# Patient Record
Sex: FEMALE
[De-identification: names, ages, dates, MRNs, and addresses within clinical notes are randomized per-mention and may not be internally consistent; named-entity substitution may affect disease eponyms.]

---

## 2017-08-24 PROBLEM — Z00.00 ENCOUNTER FOR PREVENTIVE HEALTH EXAMINATION: Status: ACTIVE | Noted: 2017-08-24

## 2017-11-20 ENCOUNTER — APPOINTMENT (OUTPATIENT)
Dept: NEUROLOGY | Facility: CLINIC | Age: 82
End: 2017-11-20
Payer: MEDICARE

## 2017-11-20 VITALS
SYSTOLIC BLOOD PRESSURE: 163 MMHG | DIASTOLIC BLOOD PRESSURE: 72 MMHG | BODY MASS INDEX: 19.24 KG/M2 | WEIGHT: 98 LBS | HEIGHT: 60 IN | HEART RATE: 60 BPM

## 2017-11-20 DIAGNOSIS — F02.81 ALZHEIMER'S DISEASE WITH EARLY ONSET: ICD-10-CM

## 2017-11-20 DIAGNOSIS — E05.20 THYROTOXICOSIS WITH TOXIC MULTINODULAR GOITER W/OUT THYROTOXIC CRISIS OR STORM: ICD-10-CM

## 2017-11-20 DIAGNOSIS — G30.0 ALZHEIMER'S DISEASE WITH EARLY ONSET: ICD-10-CM

## 2017-11-20 DIAGNOSIS — E78.5 HYPERLIPIDEMIA, UNSPECIFIED: ICD-10-CM

## 2017-11-20 DIAGNOSIS — I10 ESSENTIAL (PRIMARY) HYPERTENSION: ICD-10-CM

## 2017-11-20 DIAGNOSIS — Z78.9 OTHER SPECIFIED HEALTH STATUS: ICD-10-CM

## 2017-11-20 DIAGNOSIS — Z82.49 FAMILY HISTORY OF ISCHEMIC HEART DISEASE AND OTHER DISEASES OF THE CIRCULATORY SYSTEM: ICD-10-CM

## 2017-11-20 DIAGNOSIS — Z80.9 FAMILY HISTORY OF MALIGNANT NEOPLASM, UNSPECIFIED: ICD-10-CM

## 2017-11-20 PROCEDURE — 99205 OFFICE O/P NEW HI 60 MIN: CPT

## 2018-01-02 RX ORDER — VENLAFAXINE 75 MG/1
75 TABLET ORAL DAILY
Refills: 0 | Status: DISCONTINUED | COMMUNITY
Start: 2017-11-20 | End: 2018-01-02

## 2018-01-16 ENCOUNTER — RX RENEWAL (OUTPATIENT)
Age: 83
End: 2018-01-16

## 2018-01-17 ENCOUNTER — APPOINTMENT (OUTPATIENT)
Dept: NEUROLOGY | Facility: CLINIC | Age: 83
End: 2018-01-17

## 2018-02-13 ENCOUNTER — APPOINTMENT (OUTPATIENT)
Dept: NEUROLOGY | Facility: CLINIC | Age: 83
End: 2018-02-13
Payer: MEDICARE

## 2018-02-13 VITALS — SYSTOLIC BLOOD PRESSURE: 178 MMHG | HEART RATE: 71 BPM | DIASTOLIC BLOOD PRESSURE: 69 MMHG | HEIGHT: 60 IN

## 2018-02-13 PROCEDURE — 99214 OFFICE O/P EST MOD 30 MIN: CPT

## 2018-02-13 RX ORDER — MEMANTINE HYDROCHLORIDE AND DONEPEZIL HYDROCHLORIDE 28; 10 MG/1; MG/1
28-10 CAPSULE ORAL
Qty: 30 | Refills: 3 | Status: DISCONTINUED | COMMUNITY
Start: 2017-11-20 | End: 2018-02-13

## 2018-02-19 ENCOUNTER — RX RENEWAL (OUTPATIENT)
Age: 83
End: 2018-02-19

## 2018-02-21 ENCOUNTER — APPOINTMENT (OUTPATIENT)
Dept: NEUROLOGY | Facility: CLINIC | Age: 83
End: 2018-02-21
Payer: MEDICARE

## 2018-02-21 PROCEDURE — 93880 EXTRACRANIAL BILAT STUDY: CPT

## 2018-02-21 PROCEDURE — 93888 INTRACRANIAL LIMITED STUDY: CPT

## 2018-03-19 ENCOUNTER — OTHER (OUTPATIENT)
Age: 83
End: 2018-03-19

## 2018-03-19 ENCOUNTER — RX RENEWAL (OUTPATIENT)
Age: 83
End: 2018-03-19

## 2018-03-19 RX ORDER — SERTRALINE HYDROCHLORIDE 50 MG/1
50 TABLET, FILM COATED ORAL DAILY
Qty: 30 | Refills: 2 | Status: DISCONTINUED | COMMUNITY
Start: 2018-01-02 | End: 2018-03-19

## 2018-03-19 RX ORDER — MEMANTINE HYDROCHLORIDE 14 MG/1
14 CAPSULE, EXTENDED RELEASE ORAL
Qty: 30 | Refills: 0 | Status: DISCONTINUED | COMMUNITY
Start: 2018-02-13 | End: 2018-03-19

## 2018-04-23 ENCOUNTER — RX RENEWAL (OUTPATIENT)
Age: 83
End: 2018-04-23

## 2018-08-27 ENCOUNTER — RX RENEWAL (OUTPATIENT)
Age: 83
End: 2018-08-27

## 2018-09-06 ENCOUNTER — RX RENEWAL (OUTPATIENT)
Age: 83
End: 2018-09-06

## 2018-09-09 ENCOUNTER — RX RENEWAL (OUTPATIENT)
Age: 83
End: 2018-09-09

## 2018-09-13 RX ORDER — MEMANTINE HYDROCHLORIDE 7 MG/1
7 CAPSULE, EXTENDED RELEASE ORAL
Qty: 30 | Refills: 2 | Status: DISCONTINUED | COMMUNITY
Start: 2018-08-13 | End: 2018-09-13

## 2018-10-08 ENCOUNTER — APPOINTMENT (OUTPATIENT)
Dept: NEUROLOGY | Facility: CLINIC | Age: 83
End: 2018-10-08
Payer: MEDICARE

## 2018-10-08 VITALS
BODY MASS INDEX: 19.24 KG/M2 | WEIGHT: 98 LBS | HEIGHT: 60 IN | HEART RATE: 63 BPM | DIASTOLIC BLOOD PRESSURE: 73 MMHG | SYSTOLIC BLOOD PRESSURE: 175 MMHG

## 2018-10-08 DIAGNOSIS — F43.21 ADJUSTMENT DISORDER WITH DEPRESSED MOOD: ICD-10-CM

## 2018-10-08 PROCEDURE — 99214 OFFICE O/P EST MOD 30 MIN: CPT

## 2018-10-16 ENCOUNTER — RX RENEWAL (OUTPATIENT)
Age: 83
End: 2018-10-16

## 2018-11-09 ENCOUNTER — RX RENEWAL (OUTPATIENT)
Age: 83
End: 2018-11-09

## 2018-11-27 ENCOUNTER — RX RENEWAL (OUTPATIENT)
Age: 83
End: 2018-11-27

## 2018-11-28 ENCOUNTER — RX RENEWAL (OUTPATIENT)
Age: 83
End: 2018-11-28

## 2018-12-02 ENCOUNTER — RX RENEWAL (OUTPATIENT)
Age: 83
End: 2018-12-02

## 2018-12-28 ENCOUNTER — RX RENEWAL (OUTPATIENT)
Age: 83
End: 2018-12-28

## 2018-12-31 ENCOUNTER — RX RENEWAL (OUTPATIENT)
Age: 83
End: 2018-12-31

## 2019-01-14 RX ORDER — DONEPEZIL HYDROCHLORIDE 23 MG/1
23 TABLET, FILM COATED ORAL DAILY
Qty: 30 | Refills: 2 | Status: DISCONTINUED | COMMUNITY
Start: 2018-02-13 | End: 2019-01-14

## 2019-02-21 ENCOUNTER — RX RENEWAL (OUTPATIENT)
Age: 84
End: 2019-02-21

## 2019-03-28 RX ORDER — ALPRAZOLAM 0.25 MG/1
0.25 TABLET ORAL DAILY
Refills: 0 | Status: DISCONTINUED | COMMUNITY
Start: 2018-02-13 | End: 2019-03-28

## 2019-03-28 RX ORDER — DONEPEZIL HYDROCHLORIDE 10 MG/1
10 TABLET ORAL DAILY
Qty: 90 | Refills: 0 | Status: DISCONTINUED | COMMUNITY
Start: 2019-01-14 | End: 2019-03-28

## 2019-04-29 RX ORDER — MEMANTINE HYDROCHLORIDE AND DONEPEZIL HYDROCHLORIDE 7-10/14-10
7 & 14 & 21 &28 KIT ORAL
Qty: 1 | Refills: 1 | Status: DISCONTINUED | COMMUNITY
Start: 2019-03-28 | End: 2019-04-29

## 2019-05-06 ENCOUNTER — APPOINTMENT (OUTPATIENT)
Dept: NEUROLOGY | Facility: CLINIC | Age: 84
End: 2019-05-06
Payer: MEDICARE

## 2019-05-06 VITALS
SYSTOLIC BLOOD PRESSURE: 160 MMHG | BODY MASS INDEX: 23.56 KG/M2 | HEIGHT: 60 IN | HEART RATE: 80 BPM | DIASTOLIC BLOOD PRESSURE: 80 MMHG | WEIGHT: 120 LBS

## 2019-05-06 PROCEDURE — 99214 OFFICE O/P EST MOD 30 MIN: CPT

## 2019-05-06 RX ORDER — MEMANTINE HYDROCHLORIDE AND DONEPEZIL HYDROCHLORIDE 28; 10 MG/1; MG/1
28-10 CAPSULE ORAL
Qty: 30 | Refills: 3 | Status: DISCONTINUED | COMMUNITY
Start: 2019-04-29 | End: 2019-05-06

## 2019-05-06 NOTE — PHYSICAL EXAM
[General Appearance - Alert] : alert [General Appearance - In No Acute Distress] : in no acute distress [Impaired Insight] : insight and judgment were intact [Oriented To Time, Place, And Person] : oriented to person, place, and time [Affect] : the affect was normal [Person] : oriented to person [Time] : oriented to time [Place] : oriented to place [Remote Intact] : remote memory intact [Registration Intact] : recent registration memory intact [Concentration Intact] : normal concentrating ability [Visual Intact] : visual attention was ~T not ~L decreased [Naming Objects] : no difficulty naming common objects [Repeating Phrases] : no difficulty repeating a phrase [Writing A Sentence] : no difficulty writing a sentence [Fluency] : fluency intact [Comprehension] : comprehension intact [Reading] : reading intact [Past History] : adequate knowledge of personal past history [Vocabulary] : adequate range of vocabulary [Total Score ___ / 30] : the patient achieved a score of [unfilled] /30 [Date / Time ___ / 5] : date / time [unfilled] / 5 [Place ___ / 5] : place [unfilled] / 5 [Registration ___ / 3] : registration [unfilled] / 3 [Serial Sevens ___/5] : serial sevens [unfilled] / 5 [Naming 2 Objects ___ / 2] : naming two objects [unfilled] / 2 [Repeating a Sentence ___ / 1] : repeating a sentence [unfilled] / 1 [3-stage Verbal Command ___ / 3] : three-stage verbal command [unfilled] / 3 [Writing a Sentence ___ / 1] : write sentence [unfilled] / 1 [Written Command ___ / 1] : written command [unfilled] / 1 [Copy a Design ___ / 1] : copy a design [unfilled] / 1 [Cranial Nerves Optic (II)] : visual acuity intact bilaterally,  visual fields full to confrontation, pupils equal round and reactive to light [Recall ___ / 3] : recall [unfilled] / 3 [Cranial Nerves Facial (VII)] : face symmetrical [Cranial Nerves Oculomotor (III)] : extraocular motion intact [Cranial Nerves Trigeminal (V)] : facial sensation intact symmetrically [Cranial Nerves Glossopharyngeal (IX)] : tongue and palate midline [Cranial Nerves Vestibulocochlear (VIII)] : hearing was intact bilaterally [Cranial Nerves Hypoglossal (XII)] : there was no tongue deviation with protrusion [Cranial Nerves Accessory (XI - Cranial And Spinal)] : head turning and shoulder shrug symmetric [Motor Strength] : muscle strength was normal in all four extremities [Involuntary Movements] : no involuntary movements were seen [No Muscle Atrophy] : normal bulk in all four extremities [Motor Handedness Right-Handed] : the patient is right hand dominant [Sensation Tactile Decrease] : light touch was intact [Sensation Pain / Temperature Decrease] : pain and temperature was intact [Sensation Vibration Decrease] : vibration was intact [Proprioception] : proprioception was intact [Balance] : balance was intact [2+] : Brachioradialis right 2+ [1+] : Ankle jerk left 1+ [PERRL With Normal Accommodation] : pupils were equal in size, round, reactive to light, with normal accommodation [Sclera] : the sclera and conjunctiva were normal [Extraocular Movements] : extraocular movements were intact [Optic Disc Abnormality] : the optic disc were normal in size and color [No APD] : no afferent pupillary defect [No JOSE] : no internuclear ophthalmoplegia [Full Visual Field] : full visual field [Outer Ear] : the ears and nose were normal in appearance [Oropharynx] : the oropharynx was normal [Neck Appearance] : the appearance of the neck was normal [Neck Cervical Mass (___cm)] : no neck mass was observed [Jugular Venous Distention Increased] : there was no jugular-venous distention [Thyroid Diffuse Enlargement] : the thyroid was not enlarged [Thyroid Nodule] : there were no palpable thyroid nodules [Auscultation Breath Sounds / Voice Sounds] : lungs were clear to auscultation bilaterally [Heart Rate And Rhythm] : heart rate was normal and rhythm regular [Heart Sounds] : normal S1 and S2 [Murmurs] : no murmurs [Heart Sounds Gallop] : no gallops [Heart Sounds Pericardial Friction Rub] : no pericardial rub [Arterial Pulses Carotid] : carotid pulses were normal with no bruits [Full Pulse] : the pedal pulses are present [Edema] : there was no peripheral edema [Bowel Sounds] : normal bowel sounds [Abdomen Soft] : soft [Abdomen Tenderness] : non-tender [Abdomen Mass (___ Cm)] : no abdominal mass palpated [No CVA Tenderness] : no ~M costovertebral angle tenderness [No Spinal Tenderness] : no spinal tenderness [Abnormal Walk] : normal gait [Nail Clubbing] : no clubbing  or cyanosis of the fingernails [Musculoskeletal - Swelling] : no joint swelling seen [Motor Tone] : muscle strength and tone were normal [Skin Color & Pigmentation] : normal skin color and pigmentation [Skin Turgor] : normal skin turgor [] : no rash [FreeTextEntry1] : Appears more confused and frustrated.  [Short Term Intact] : short term memory impaired [Span Intact] : the attention span was decreased [Current Events] : inadequate knowledge of current events [Allodynia] : no ~T allodynia present [Dysesthesia] : no dysesthesia [Romberg's Sign] : Romberg's sign was negtive [Hyperesthesia] : no hyperesthesia [Limited Balance] : balance was intact [Past-pointing] : there was no past-pointing [Tremor] : no tremor present [Dysdiadochokinesia Bilaterally] : not present [Coordination - Dysmetria Impaired Finger-to-Nose Bilateral] : not present [Coordination - Dysmetria Impaired Heel-to-Shin Bilateral] : not present [Plantar Reflex Right Only] : normal on the right [Plantar Reflex Left Only] : normal on the left [FreeTextEntry4] : Presidents: 0/5.\par Alternating Pattern: ok\par Clock: 2/3, can read clocks\par Luria: ok, forgets pattern\par Spiral: ok\par Knows R/L on self and examiner.\par X-line commands: ok\par Praxia: ok.

## 2019-05-06 NOTE — ASSESSMENT
[FreeTextEntry1] : Assessment:\par 89yo RH WW, with progressive cognitive decline. She has not tolerated Namzaric, likely the Memantine component. \par Also Lexapro dc due to weight gain, and now appears more frustrated, confused and depressed. \par Physically fine, no focal neuro signs, no parkinsonism.\par \par Impression:\par LOAD\par Depression\par \par Plan:\par -will dc Memantine, continue with ARicept alone, 10mg\par -will consider starting Paxil in the next 3 weeks, after progress report by daughter\par -encourage exercise as well as senior activities.\par \par A thorough discussion was entertained with the patient/caregiver regarding the use of psychoactive medications, their possible benefits and AE profile, including the risk of cardiovascular complications.\par We discussed the benefits of being active, physically and mentally, and the need to to establish a routine in this respect.\par Driving abilities and firearms possession and use were discussed, in relation to progression of the cognitive decline, and the need to assess them periodically.\par Patient/caregiver understand and agree with the plan.\par

## 2019-05-06 NOTE — HISTORY OF PRESENT ILLNESS
[FreeTextEntry1] : HPI-Interval Hx [id number]:\par since starting Namnzaric, per daughter, she is more unsteady, more unsure, more confused. Her PCP dc Lexapro due to weight gain. She also appears more depressed. \par Going to senior center 4 days a week, but not much exercise. \par Appetite is fine. Sleep is maintained. \par per family, gait is a bit slower, but no falls. \par \par HPI-Interval Hx [id number]:\par After the recent passing of her , she has had a set back, of mood and memory. She had lost weight, now getting it back.\par Daughter is very distraught, as she also lost her  1y ago, and s trying to get better.\par Better with Lexapro and Zyprexa.\par \par HPI-Interval Hx [id number]:\par Family unable to get MRI, will call directly. \par Ok on meds, no AE, but has sundowning confusion and gets anxious.\par Physically ok, sleep and appetite are fine. \par \par PMH:\par 87yo RH WW, with HTN, HLD, depression, here for evaluation of memory issues. 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 Started Namzaric, now at max dose. \par \par ADL: ok.\par IADL: limited. can cook and wash house. Does Chores. \par Sleep: ok.\par Appetite: intact, but has lost several lbs in the last few months. \par \par Personality: she might react badly to criticism. \par \par She has been very independent and active until very recently.

## 2019-05-06 NOTE — DATA REVIEWED
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

## 2019-05-06 NOTE — REASON FOR VISIT
[Follow-Up: _____] : a [unfilled] follow-up visit [Spouse] : spouse [Family Member] : family member [FreeTextEntry1] : LOAD

## 2019-06-13 ENCOUNTER — RX RENEWAL (OUTPATIENT)
Age: 84
End: 2019-06-13

## 2019-06-17 ENCOUNTER — RX RENEWAL (OUTPATIENT)
Age: 84
End: 2019-06-17

## 2019-07-15 ENCOUNTER — MEDICATION RENEWAL (OUTPATIENT)
Age: 84
End: 2019-07-15

## 2019-08-05 ENCOUNTER — APPOINTMENT (OUTPATIENT)
Dept: NEUROLOGY | Facility: CLINIC | Age: 84
End: 2019-08-05
Payer: MEDICARE

## 2019-08-05 VITALS
DIASTOLIC BLOOD PRESSURE: 65 MMHG | BODY MASS INDEX: 24.19 KG/M2 | HEART RATE: 81 BPM | SYSTOLIC BLOOD PRESSURE: 168 MMHG | HEIGHT: 59 IN | WEIGHT: 120 LBS

## 2019-08-05 PROCEDURE — 99214 OFFICE O/P EST MOD 30 MIN: CPT

## 2019-08-05 RX ORDER — ESCITALOPRAM OXALATE 10 MG/1
10 TABLET ORAL DAILY
Qty: 90 | Refills: 0 | Status: DISCONTINUED | COMMUNITY
Start: 2017-11-20 | End: 2019-08-05

## 2019-08-05 NOTE — HISTORY OF PRESENT ILLNESS
[FreeTextEntry1] : HPI-Interval Hx [id number]:\par Pt overall stable, but apathy is important.\par Per daughter, she goes with her to do activities and exercise, but when she is at home she would sleep all the time.\par Lexapro at 5mg, reduced due to sleepiness, though not the cause.\par Appetite is fine. \par \par HPI-Interval Hx [id number]:\par since starting Namnzaric, per daughter, she is more unsteady, more unsure, more confused. Her PCP dc Lexapro due to weight gain. She also appears more depressed. \par Going to [employer] 4 days a week, but not much exercise. \par Appetite is fine. Sleep is maintained. \par per family, gait is a bit slower, but no falls. \par \par HPI-Interval Hx [id number]:\par After the recent passing of her , she has had a set back, of mood and memory. She had lost weight, now getting it back.\par Daughter is very distraught, as she also lost her  1y ago, and s trying to get better.\par Better with Lexapro and Zyprexa.\par \par HPI-Interval Hx [id number]:\par Family unable to get MRI, will call directly. \par Ok on meds, no AE, but has sundowning confusion and gets anxious.\par Physically ok, sleep and appetite are fine. \par \par PMH:\par 89yo RH WW, with HTN, HLD, depression, here for evaluation of memory issues. 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 Started Namzaric, now at max dose. \par \par ADL: ok.\par IADL: limited. can cook and wash house. Does Chores. \par Sleep: ok.\par Appetite: intact, but has lost several lbs in the last few months. \par \par Personality: she might react badly to criticism. \par \par She has been very independent and active until very recently.

## 2019-08-05 NOTE — DATA REVIEWED
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

## 2019-08-05 NOTE — PHYSICAL EXAM
[General Appearance - Alert] : alert [General Appearance - In No Acute Distress] : in no acute distress [Oriented To Time, Place, And Person] : oriented to person, place, and time [Impaired Insight] : insight and judgment were intact [Affect] : the affect was normal [Person] : oriented to person [Place] : oriented to place [Time] : oriented to time [Remote Intact] : remote memory intact [Registration Intact] : recent registration memory intact [Concentration Intact] : normal concentrating ability [Naming Objects] : no difficulty naming common objects [Visual Intact] : visual attention was ~T not ~L decreased [Repeating Phrases] : no difficulty repeating a phrase [Writing A Sentence] : no difficulty writing a sentence [Fluency] : fluency intact [Comprehension] : comprehension intact [Reading] : reading intact [Vocabulary] : adequate range of vocabulary [Past History] : adequate knowledge of personal past history [Total Score ___ / 30] : the patient achieved a score of [unfilled] /30 [Date / Time ___ / 5] : date / time [unfilled] / 5 [Place ___ / 5] : place [unfilled] / 5 [Serial Sevens ___/5] : serial sevens [unfilled] / 5 [Registration ___ / 3] : registration [unfilled] / 3 [Naming 2 Objects ___ / 2] : naming two objects [unfilled] / 2 [Repeating a Sentence ___ / 1] : repeating a sentence [unfilled] / 1 [Writing a Sentence ___ / 1] : write sentence [unfilled] / 1 [3-stage Verbal Command ___ / 3] : three-stage verbal command [unfilled] / 3 [Copy a Design ___ / 1] : copy a design [unfilled] / 1 [Written Command ___ / 1] : written command [unfilled] / 1 [Recall ___ / 3] : recall [unfilled] / 3 [Cranial Nerves Trigeminal (V)] : facial sensation intact symmetrically [Cranial Nerves Optic (II)] : visual acuity intact bilaterally,  visual fields full to confrontation, pupils equal round and reactive to light [Cranial Nerves Oculomotor (III)] : extraocular motion intact [Cranial Nerves Facial (VII)] : face symmetrical [Cranial Nerves Vestibulocochlear (VIII)] : hearing was intact bilaterally [Cranial Nerves Glossopharyngeal (IX)] : tongue and palate midline [Cranial Nerves Accessory (XI - Cranial And Spinal)] : head turning and shoulder shrug symmetric [Cranial Nerves Hypoglossal (XII)] : there was no tongue deviation with protrusion [Involuntary Movements] : no involuntary movements were seen [Motor Strength] : muscle strength was normal in all four extremities [No Muscle Atrophy] : normal bulk in all four extremities [Motor Handedness Right-Handed] : the patient is right hand dominant [Sensation Tactile Decrease] : light touch was intact [Sensation Pain / Temperature Decrease] : pain and temperature was intact [Sensation Vibration Decrease] : vibration was intact [Proprioception] : proprioception was intact [Balance] : balance was intact [2+] : Brachioradialis left 2+ [1+] : Ankle jerk left 1+ [Sclera] : the sclera and conjunctiva were normal [Extraocular Movements] : extraocular movements were intact [PERRL With Normal Accommodation] : pupils were equal in size, round, reactive to light, with normal accommodation [Optic Disc Abnormality] : the optic disc were normal in size and color [No APD] : no afferent pupillary defect [Full Visual Field] : full visual field [No JOSE] : no internuclear ophthalmoplegia [Oropharynx] : the oropharynx was normal [Outer Ear] : the ears and nose were normal in appearance [Neck Appearance] : the appearance of the neck was normal [Jugular Venous Distention Increased] : there was no jugular-venous distention [Neck Cervical Mass (___cm)] : no neck mass was observed [Thyroid Nodule] : there were no palpable thyroid nodules [Thyroid Diffuse Enlargement] : the thyroid was not enlarged [Auscultation Breath Sounds / Voice Sounds] : lungs were clear to auscultation bilaterally [Heart Sounds] : normal S1 and S2 [Heart Rate And Rhythm] : heart rate was normal and rhythm regular [Heart Sounds Gallop] : no gallops [Heart Sounds Pericardial Friction Rub] : no pericardial rub [Murmurs] : no murmurs [Arterial Pulses Carotid] : carotid pulses were normal with no bruits [Full Pulse] : the pedal pulses are present [Edema] : there was no peripheral edema [Bowel Sounds] : normal bowel sounds [Abdomen Soft] : soft [Abdomen Tenderness] : non-tender [Abdomen Mass (___ Cm)] : no abdominal mass palpated [No CVA Tenderness] : no ~M costovertebral angle tenderness [No Spinal Tenderness] : no spinal tenderness [Abnormal Walk] : normal gait [Nail Clubbing] : no clubbing  or cyanosis of the fingernails [Musculoskeletal - Swelling] : no joint swelling seen [Motor Tone] : muscle strength and tone were normal [Skin Color & Pigmentation] : normal skin color and pigmentation [Skin Turgor] : normal skin turgor [] : no rash [FreeTextEntry1] : Exam stable.  [Short Term Intact] : short term memory impaired [Span Intact] : the attention span was decreased [Current Events] : inadequate knowledge of current events [Romberg's Sign] : Romberg's sign was negtive [Allodynia] : no ~T allodynia present [Dysesthesia] : no dysesthesia [Hyperesthesia] : no hyperesthesia [Limited Balance] : balance was intact [Past-pointing] : there was no past-pointing [Tremor] : no tremor present [Coordination - Dysmetria Impaired Finger-to-Nose Bilateral] : not present [Dysdiadochokinesia Bilaterally] : not present [Plantar Reflex Right Only] : normal on the right [Coordination - Dysmetria Impaired Heel-to-Shin Bilateral] : not present [Plantar Reflex Left Only] : normal on the left [FreeTextEntry4] : Presidents: 0/5.\par Alternating Pattern: ok\par Clock: 2/3, can read clocks\par Luria: ok, forgets pattern\par Spiral: ok\par Knows R/L on self and examiner.\par X-line commands: ok\par Praxia: ok.

## 2019-08-19 ENCOUNTER — RX RENEWAL (OUTPATIENT)
Age: 84
End: 2019-08-19

## 2019-09-16 ENCOUNTER — RX RENEWAL (OUTPATIENT)
Age: 84
End: 2019-09-16

## 2020-01-07 ENCOUNTER — APPOINTMENT (OUTPATIENT)
Dept: NEUROLOGY | Facility: CLINIC | Age: 85
End: 2020-01-07
Payer: MEDICARE

## 2020-01-07 VITALS — HEART RATE: 80 BPM | DIASTOLIC BLOOD PRESSURE: 77 MMHG | SYSTOLIC BLOOD PRESSURE: 169 MMHG

## 2020-01-07 PROCEDURE — 99214 OFFICE O/P EST MOD 30 MIN: CPT

## 2020-01-07 RX ORDER — OLANZAPINE 2.5 MG/1
2.5 TABLET, FILM COATED ORAL
Qty: 90 | Refills: 3 | Status: DISCONTINUED | COMMUNITY
Start: 2018-09-13 | End: 2020-01-07

## 2020-01-07 NOTE — PHYSICAL EXAM
[General Appearance - In No Acute Distress] : in no acute distress [General Appearance - Alert] : alert [Impaired Insight] : insight and judgment were intact [Affect] : the affect was normal [Place] : oriented to place [Person] : oriented to person [Time] : oriented to time [Remote Intact] : remote memory intact [Registration Intact] : recent registration memory intact [Concentration Intact] : normal concentrating ability [Visual Intact] : visual attention was ~T not ~L decreased [Naming Objects] : no difficulty naming common objects [Repeating Phrases] : no difficulty repeating a phrase [Writing A Sentence] : no difficulty writing a sentence [Fluency] : fluency intact [Comprehension] : comprehension intact [Reading] : reading intact [Past History] : adequate knowledge of personal past history [Total Score ___ / 30] : the patient achieved a score of [unfilled] /30 [Vocabulary] : adequate range of vocabulary [Date / Time ___ / 5] : date / time [unfilled] / 5 [Registration ___ / 3] : registration [unfilled] / 3 [Place ___ / 5] : place [unfilled] / 5 [Serial Sevens ___/5] : serial sevens [unfilled] / 5 [Naming 2 Objects ___ / 2] : naming two objects [unfilled] / 2 [Repeating a Sentence ___ / 1] : repeating a sentence [unfilled] / 1 [Writing a Sentence ___ / 1] : write sentence [unfilled] / 1 [3-stage Verbal Command ___ / 3] : three-stage verbal command [unfilled] / 3 [Written Command ___ / 1] : written command [unfilled] / 1 [Copy a Design ___ / 1] : copy a design [unfilled] / 1 [Recall ___ / 3] : recall [unfilled] / 3 [Cranial Nerves Optic (II)] : visual acuity intact bilaterally,  visual fields full to confrontation, pupils equal round and reactive to light [Cranial Nerves Oculomotor (III)] : extraocular motion intact [Cranial Nerves Trigeminal (V)] : facial sensation intact symmetrically [Cranial Nerves Facial (VII)] : face symmetrical [Cranial Nerves Glossopharyngeal (IX)] : tongue and palate midline [Cranial Nerves Vestibulocochlear (VIII)] : hearing was intact bilaterally [Cranial Nerves Hypoglossal (XII)] : there was no tongue deviation with protrusion [Cranial Nerves Accessory (XI - Cranial And Spinal)] : head turning and shoulder shrug symmetric [Motor Strength] : muscle strength was normal in all four extremities [Involuntary Movements] : no involuntary movements were seen [No Muscle Atrophy] : normal bulk in all four extremities [Motor Handedness Right-Handed] : the patient is right hand dominant [Sensation Tactile Decrease] : light touch was intact [Sensation Pain / Temperature Decrease] : pain and temperature was intact [Sensation Vibration Decrease] : vibration was intact [Proprioception] : proprioception was intact [Balance] : balance was intact [2+] : Brachioradialis left 2+ [1+] : Patella left 1+ [Sclera] : the sclera and conjunctiva were normal [Extraocular Movements] : extraocular movements were intact [PERRL With Normal Accommodation] : pupils were equal in size, round, reactive to light, with normal accommodation [Optic Disc Abnormality] : the optic disc were normal in size and color [No JOSE] : no internuclear ophthalmoplegia [No APD] : no afferent pupillary defect [Full Visual Field] : full visual field [Outer Ear] : the ears and nose were normal in appearance [Neck Appearance] : the appearance of the neck was normal [Neck Cervical Mass (___cm)] : no neck mass was observed [Oropharynx] : the oropharynx was normal [Thyroid Diffuse Enlargement] : the thyroid was not enlarged [Jugular Venous Distention Increased] : there was no jugular-venous distention [Thyroid Nodule] : there were no palpable thyroid nodules [Auscultation Breath Sounds / Voice Sounds] : lungs were clear to auscultation bilaterally [Heart Rate And Rhythm] : heart rate was normal and rhythm regular [Heart Sounds] : normal S1 and S2 [Heart Sounds Gallop] : no gallops [Murmurs] : no murmurs [Heart Sounds Pericardial Friction Rub] : no pericardial rub [Arterial Pulses Carotid] : carotid pulses were normal with no bruits [Full Pulse] : the pedal pulses are present [Edema] : there was no peripheral edema [Bowel Sounds] : normal bowel sounds [Abdomen Tenderness] : non-tender [Abdomen Soft] : soft [Abdomen Mass (___ Cm)] : no abdominal mass palpated [Abnormal Walk] : normal gait [No Spinal Tenderness] : no spinal tenderness [No CVA Tenderness] : no ~M costovertebral angle tenderness [Nail Clubbing] : no clubbing  or cyanosis of the fingernails [Musculoskeletal - Swelling] : no joint swelling seen [Skin Color & Pigmentation] : normal skin color and pigmentation [Motor Tone] : muscle strength and tone were normal [] : no rash [Skin Turgor] : normal skin turgor [FreeTextEntry1] : Exam stable.  [Short Term Intact] : short term memory impaired [Span Intact] : the attention span was decreased [Current Events] : inadequate knowledge of current events [Romberg's Sign] : Romberg's sign was negtive [Allodynia] : no ~T allodynia present [Dysesthesia] : no dysesthesia [Hyperesthesia] : no hyperesthesia [Limited Balance] : balance was intact [Past-pointing] : there was no past-pointing [Tremor] : no tremor present [Dysdiadochokinesia Bilaterally] : not present [Coordination - Dysmetria Impaired Finger-to-Nose Bilateral] : not present [Coordination - Dysmetria Impaired Heel-to-Shin Bilateral] : not present [Plantar Reflex Right Only] : normal on the right [FreeTextEntry4] : Presidents: 0/5.\par Alternating Pattern: ok\par Clock: 2/3, can read clocks\par Luria: ok, forgets pattern\par Spiral: ok\par Knows R/L on self and examiner.\par X-line commands: ok\par Praxia: ok. [Plantar Reflex Left Only] : normal on the left [FreeTextEntry6] : Mildly reduced swing in RUE

## 2020-01-07 NOTE — DATA REVIEWED
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

## 2020-01-07 NOTE — ASSESSMENT
[FreeTextEntry1] : Assessment:\par 89yo RH WW, with progressive cognitive decline. She has not tolerated Namzaric, likely the Memantine component. \par Lexapro reduced, not tolerated, so Sertraline and Venlafaxine.\par Apathy still present, though has accepted the day center. \par Physically fine, no focal neuro signs, no parkinsonism, just slight reduced swing in RUE. \par STM is worse. Poor orientation.\par \par Impression:\par LOAD\par Depression\par Apathy\par \par Plan:\par -Cont Aricept 15mg\par -DC olanzapine, not needed now\par -Encourage activities, she can do 2/week day center and the rest silver sneakers.\par \par A thorough discussion was entertained with the patient/caregiver regarding the use of psychoactive medications, their possible benefits and AE profile, including the risk of cardiovascular complications.\par We discussed the benefits of being active, physically and mentally, and the need to to establish a routine in this respect.\par Driving abilities and firearms possession and use were discussed, in relation to progression of the cognitive decline, and the need to assess them periodically.\par Patient/caregiver understand and agree with the plan.\par

## 2020-01-07 NOTE — HISTORY OF PRESENT ILLNESS
[FreeTextEntry1] : HPI-Interval Hx 20200107:\par pt here for follow-up. Overall stable, but per family her STM is worse. Speech and ADL are still fine.\par IADL limited, but she can use microwave, phone. \par Limited activities, some apathy is present.\par Goes to day center 2/week. \par Aricept 15mg, well tolerated.\par Appetite and sleep are fine.\par \par HPI-Interval Hx [id number]:\par Pt overall stable, but apathy is important.\par Per daughter, she goes with her to do activities and exercise, but when she is at home she would sleep all the time.\par Lexapro at 5mg, reduced due to sleepiness, though not the cause.\par Appetite is fine. \par \par HPI-Interval Hx [id number]:\par since starting Namnzaric, per daughter, she is more unsteady, more unsure, more confused. Her PCP dc Lexapro due to weight gain. She also appears more depressed. \par Going to senior center 4 days a week, but not much exercise. \par Appetite is fine. Sleep is maintained. \par per family, gait is a bit slower, but no falls. \par \par HPI-Interval Hx [id number]:\par After the recent passing of her , she has had a set back, of mood and memory. She had lost weight, now getting it back.\par Daughter is very distraught, as she also lost her  1y ago, and s trying to get better.\par Better with Lexapro and Zyprexa.\par \par HPI-Interval Hx [id number]:\par Family unable to get MRI, will call directly. \par Ok on meds, no AE, but has sundowning confusion and gets anxious.\par Physically ok, sleep and appetite are fine. \par \par PMH:\par 89yo RH WW, with HTN, HLD, depression, here for evaluation of memory issues. 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 Started Namzaric, now at max dose. \par \par ADL: ok.\par IADL: limited. can cook and wash house. Does Chores. \par Sleep: ok.\par Appetite: intact, but has lost several lbs in the last few months. \par \par Personality: she might react badly to criticism. \par \par She has been very independent and active until very recently.

## 2020-06-08 ENCOUNTER — APPOINTMENT (OUTPATIENT)
Dept: ORTHOPEDIC SURGERY | Facility: CLINIC | Age: 85
End: 2020-06-08
Payer: MEDICARE

## 2020-06-08 VITALS
DIASTOLIC BLOOD PRESSURE: 75 MMHG | SYSTOLIC BLOOD PRESSURE: 150 MMHG | HEIGHT: 59 IN | WEIGHT: 108 LBS | BODY MASS INDEX: 21.77 KG/M2 | HEART RATE: 76 BPM

## 2020-06-08 DIAGNOSIS — M17.11 UNILATERAL PRIMARY OSTEOARTHRITIS, RIGHT KNEE: ICD-10-CM

## 2020-06-08 DIAGNOSIS — S93.402A SPRAIN OF UNSPECIFIED LIGAMENT OF LEFT ANKLE, INITIAL ENCOUNTER: ICD-10-CM

## 2020-06-08 PROCEDURE — 99204 OFFICE O/P NEW MOD 45 MIN: CPT

## 2020-06-08 PROCEDURE — 73562 X-RAY EXAM OF KNEE 3: CPT | Mod: RT

## 2020-06-08 PROCEDURE — 73610 X-RAY EXAM OF ANKLE: CPT | Mod: LT

## 2020-06-08 NOTE — DISCUSSION/SUMMARY
[de-identified] : Discussed findings of today's exam and possible causes of patient's pain.  Educated patient on their most probable diagnosis of acute grade 2 left ankle sprain, possible avulsion chip fracture of the lateral malleolus, and right knee pain due to exacerbation of underlying osteoarthritis.  Reviewed possible courses of treatment, and we collaboratively decided best course of treatment at this time will include conservative management.  Advised patient and her daughter that she should be elevating her foot/ankle above the level of her heart, educated her on toe scrunches and ankle pump exercises to alleviate swelling.  Patient will be started on a course of home physical therapy for her left ankle, and her right knee pain.  Patient may be full weightbearing as tolerated.  We applied an Ace compression wrap today and educated her daughter on how to apply it, she may also consider picking up over-the-counter ankle compression sleeve.  I advised the patient and her daughter that the left ankle issue will likely take 4-8 weeks to fully resolve, her right knee pain should be resolved prior to that time.  We may consider telehealth follow-up as needed secondary to COVID-19 pandemic.  Patient and her adult daughter appreciate and agree with current plan.\par \par This note was generated using dragon medical dictation software.  A reasonable effort has been made for proofreading its contents, but typos may still remain.  If there are any questions or points of clarification needed please notify my office.

## 2020-06-08 NOTE — PHYSICAL EXAM
[de-identified] : Constitutional: Well-nourished, well-developed, No acute distress\par Respiratory:  Good respiratory effort, no SOB\par Lymphatic: No regional lymphadenopathy, no lymphedema\par Psychiatric: Pleasant and normal affect, alert and oriented x3\par Skin: Clean dry and intact B/L UE/LE\par Musculoskeletal: normal except where as noted in regional exam\par \par Right Ankle:\par APPEARANCE: no marked deformities, no swelling or malalignment\par POSITIVE TENDERNESS: none\par NONTENDER: medial malleolus, lateral malleolus, tibialis posterior tendon, achilles tendon, no marked thickening of tendon, ATFL, CFL, PTFL, anterior tibiofibular ligament (high ankle), sinus tarsus, deltoid ligaments, 5th metatarsal. \par RANGE OF MOTION: full & painless. \par RESISTIVE TESTING: painless resisted dorsiflexion, plantar flexion, inversion & eversion. \par SPECIAL TESTS: neg anterior drawer. neg talar tilt. neg Kleiger's\par NEURO: Normal sensation of LE, DTRs 2+/4 patella and achilles\par PULSES: 2+ DP/PT pulses\par \par Left Ankle:\par APPEARANCE: + Moderate swelling / ecchymosis of the anterolateral ankle and foot, no marked deformities or malalignment\par POSITIVE TENDERNESS: ATFL, CFL, Lateral ankle\par NONTENDER: medial malleolus, lateral malleolus, tibialis posterior tendon, achilles tendon, no marked thickening of tendon, PTFL, anterior tibiofibular ligament (high ankle), sinus tarsus, deltoid ligaments, 5th metatarsal. \par RANGE OF MOTION: Mild limitation of PF and Inversion due to pain/swelling, NL DF and Eversion. \par RESISTIVE TESTING: Mild pain with resisted eversion and DF.  painless resisted  plantar flexion, and inversion. \par SPECIAL TESTS: + anterior drawer for pain without laxity, + talar tilt for pain without laxity, neg Kleiger's\par PULSES: 2+ DP/PT pulses\par Neuro: NL sensation of ankle, foot and toes, Achilles 2+/4\par \par Right knee:\par APPEARANCE: no marked deformities, no swelling or malalignment\par POSITIVE TENDERNESS:  + crepitus of the anterior knee, tenderness of patellar retinaculum and pes bursa\par NONTENDER: jt lines b/l, patellar & quadriceps tendons, MCL/LCL, ITB at the lateral femoral condyle & Gerdy's tubercle\par ROM: full & painless, although some discomfort in deep knee flexion\par RESISTIVE TESTING: + discomfort with knee ext from deep knee flexion (stretched position), painless knee flexion. \par  [de-identified] : The following radiographs were ordered and read by me during this patient's visit. I reviewed each radiograph in detail with the patient and discussed the findings as highlighted below. \par \par 3 views of the left ankle were obtained today that show degenerative changes and evidence of moderate osteoarthritis in the tibiotalar joint.  There is a small calcific deposit seen off the distal end of the lateral malleolus, possibly represents avulsion chip fracture.  No acute gross fracture, or dislocation seen at this time. There is no malalignment. No other obvious osseous abnormality. Otherwise unremarkable.\par \par 3 views of the right knee were obtained today that show degenerative changes and evidence of early severe tricompartmental osteoarthritis with presence of chondrocalcinosis.  No fracture, or dislocation seen at this time. There is no malalignment. No other obvious osseous abnormality. Otherwise unremarkable.

## 2020-06-08 NOTE — HISTORY OF PRESENT ILLNESS
[de-identified] : Patient is here for left ankle/right knee pain that began on 6/7/2020 when she got out of a reclining chair. She also hit her knee on something. She developed swelling in both areas. She has been unable to bear weight. She has been using a wheel chair to get around. She has used ice to treat her pain. Denies N/T/R/Prior injury. \par \par The patient's past medical history, past surgical history, medications and allergies were reviewed by me today and documented accordingly. In addition, the patient's family and social history, which were noncontributory to this visit, were reviewed also. Intake form was reviewed. The patient has no family history of arthritis.

## 2020-06-08 NOTE — REASON FOR VISIT
[Initial Visit] : an initial visit for [Family Member] : family member [FreeTextEntry2] : left ankle/right knee pain

## 2020-06-15 ENCOUNTER — RX RENEWAL (OUTPATIENT)
Age: 85
End: 2020-06-15

## 2020-07-07 ENCOUNTER — APPOINTMENT (OUTPATIENT)
Dept: NEUROLOGY | Facility: CLINIC | Age: 85
End: 2020-07-07
Payer: MEDICARE

## 2020-07-07 VITALS
SYSTOLIC BLOOD PRESSURE: 178 MMHG | BODY MASS INDEX: 22.18 KG/M2 | HEART RATE: 79 BPM | WEIGHT: 110 LBS | HEIGHT: 59 IN | DIASTOLIC BLOOD PRESSURE: 70 MMHG

## 2020-07-07 VITALS — TEMPERATURE: 97.3 F

## 2020-07-07 PROCEDURE — 99214 OFFICE O/P EST MOD 30 MIN: CPT

## 2020-07-07 RX ORDER — AMLODIPINE BESYLATE 5 MG/1
5 TABLET ORAL DAILY
Refills: 0 | Status: DISCONTINUED | COMMUNITY
Start: 2017-11-20 | End: 2020-07-07

## 2020-07-07 NOTE — DATA REVIEWED
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

## 2020-07-07 NOTE — ASSESSMENT
[FreeTextEntry1] : Assessment:\par 89yo RH WW, with progressive cognitive decline. \par She has not tolerated Namzaric, likely the Memantine component. \par Lexapro reduced, not tolerated, so Sertraline and Venlafaxine.\par Depression is much better since using Wellbutrin, will continue.\par Some anxiety, but manageable.\par Physically fine, no focal neuro signs, no parkinsonism, just slight reduced swing in RUE. \par Recent ankle sprain and cellulitis, improving. \par \par Impression:\par LOAD\par Depression\par Anxiety\par Apathy\par \par Plan:\par -Cont Aricept 15mg, will call me in 3 weeks with progress\par -Encourage activities, watch BP (a bit high today) and hydration in the heat.\par \par A thorough discussion was entertained with the patient/caregiver regarding the use of psychoactive medications, their possible benefits and AE profile, including the risk of cardiovascular complications.\par We discussed the benefits of being active, physically and mentally, and the need to to establish a routine in this respect.\par Driving abilities and firearms possession and use were discussed, in relation to progression of the cognitive decline, and the need to assess them periodically.\par Patient/caregiver understand and agree with the plan.\par

## 2020-07-07 NOTE — HISTORY OF PRESENT ILLNESS
[FreeTextEntry1] : HPI-Interval Hx 20200707:\par pt has been stable, but for the last 2 months family has noted more anxiety and fidgeting with objects and nails. No wounds.\par Sleep: intact. \par Appetite: ok.\par Recent ankle sprain and severe cellulitis, getting better.\par ADL still ok.\par \par HPI-Interval Hx 20200107:\par pt here for follow-up. Overall stable, but per family her STM is worse. Speech and ADL are still fine.\par IADL limited, but she can use microwave, phone. \par Limited activities, some apathy is present.\par Goes to day center 2/week. \par Aricept 15mg, well tolerated.\par Appetite and sleep are fine.\par \par HPI-Interval Hx [id number]:\par Pt overall stable, but apathy is important.\par Per daughter, she goes with her to do activities and exercise, but when she is at home she would sleep all the time.\par Lexapro at 5mg, reduced due to sleepiness, though not the cause.\par Appetite is fine. \par \par HPI-Interval Hx [id number]:\par since starting Namnzaric, per daughter, she is more unsteady, more unsure, more confused. Her PCP dc Lexapro due to weight gain. She also appears more depressed. \par Going to senior center 4 days a week, but not much exercise. \par Appetite is fine. Sleep is maintained. \par per family, gait is a bit slower, but no falls. \par \par HPI-Interval Hx [id number]:\par After the recent passing of her , she has had a set back, of mood and memory. She had lost weight, now getting it back.\par Daughter is very distraught, as she also lost her  1y ago, and s trying to get better.\par Better with Lexapro and Zyprexa.\par \par HPI-Interval Hx [id number]:\par Family unable to get MRI, will call directly. \par Ok on meds, no AE, but has sundowning confusion and gets anxious.\par Physically ok, sleep and appetite are fine. \par \par PMH:\par 89yo RH WW, with HTN, HLD, depression, here for evaluation of memory issues. 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 Started Namzaric, now at max dose. \par \par ADL: ok.\par IADL: limited. can cook and wash house. Does Chores. \par Sleep: ok.\par Appetite: intact, but has lost several lbs in the last few months. \par \par Personality: she might react badly to criticism. \par \par She has been very independent and active until very recently.

## 2020-07-07 NOTE — PHYSICAL EXAM
[General Appearance - Alert] : alert [General Appearance - In No Acute Distress] : in no acute distress [Impaired Insight] : insight and judgment were intact [Affect] : the affect was normal [Person] : oriented to person [Remote Intact] : remote memory intact [Registration Intact] : recent registration memory intact [Concentration Intact] : normal concentrating ability [Visual Intact] : visual attention was ~T not ~L decreased [Naming Objects] : no difficulty naming common objects [Repeating Phrases] : no difficulty repeating a phrase [Writing A Sentence] : no difficulty writing a sentence [Comprehension] : comprehension intact [Fluency] : fluency intact [Reading] : reading intact [Past History] : adequate knowledge of personal past history [Vocabulary] : adequate range of vocabulary [Total Score ___ / 30] : the patient achieved a score of [unfilled] /30 [Date / Time ___ / 5] : date / time [unfilled] / 5 [Place ___ / 5] : place [unfilled] / 5 [Registration ___ / 3] : registration [unfilled] / 3 [Serial Sevens ___/5] : serial sevens [unfilled] / 5 [Naming 2 Objects ___ / 2] : naming two objects [unfilled] / 2 [Repeating a Sentence ___ / 1] : repeating a sentence [unfilled] / 1 [Writing a Sentence ___ / 1] : write sentence [unfilled] / 1 [3-stage Verbal Command ___ / 3] : three-stage verbal command [unfilled] / 3 [Written Command ___ / 1] : written command [unfilled] / 1 [Copy a Design ___ / 1] : copy a design [unfilled] / 1 [Recall ___ / 3] : recall [unfilled] / 3 [Cranial Nerves Optic (II)] : visual acuity intact bilaterally,  visual fields full to confrontation, pupils equal round and reactive to light [Cranial Nerves Oculomotor (III)] : extraocular motion intact [Cranial Nerves Facial (VII)] : face symmetrical [Cranial Nerves Trigeminal (V)] : facial sensation intact symmetrically [Cranial Nerves Vestibulocochlear (VIII)] : hearing was intact bilaterally [Cranial Nerves Glossopharyngeal (IX)] : tongue and palate midline [Cranial Nerves Accessory (XI - Cranial And Spinal)] : head turning and shoulder shrug symmetric [Cranial Nerves Hypoglossal (XII)] : there was no tongue deviation with protrusion [Motor Strength] : muscle strength was normal in all four extremities [Involuntary Movements] : no involuntary movements were seen [No Muscle Atrophy] : normal bulk in all four extremities [Motor Handedness Right-Handed] : the patient is right hand dominant [Sensation Tactile Decrease] : light touch was intact [Sensation Vibration Decrease] : vibration was intact [Sensation Pain / Temperature Decrease] : pain and temperature was intact [Proprioception] : proprioception was intact [Balance] : balance was intact [2+] : Brachioradialis left 2+ [1+] : Ankle jerk left 1+ [Sclera] : the sclera and conjunctiva were normal [PERRL With Normal Accommodation] : pupils were equal in size, round, reactive to light, with normal accommodation [Extraocular Movements] : extraocular movements were intact [Optic Disc Abnormality] : the optic disc were normal in size and color [No APD] : no afferent pupillary defect [No JOSE] : no internuclear ophthalmoplegia [Outer Ear] : the ears and nose were normal in appearance [Full Visual Field] : full visual field [Oropharynx] : the oropharynx was normal [Neck Appearance] : the appearance of the neck was normal [Jugular Venous Distention Increased] : there was no jugular-venous distention [Neck Cervical Mass (___cm)] : no neck mass was observed [Thyroid Diffuse Enlargement] : the thyroid was not enlarged [Thyroid Nodule] : there were no palpable thyroid nodules [Heart Sounds] : normal S1 and S2 [Heart Rate And Rhythm] : heart rate was normal and rhythm regular [Auscultation Breath Sounds / Voice Sounds] : lungs were clear to auscultation bilaterally [Heart Sounds Gallop] : no gallops [Murmurs] : no murmurs [Arterial Pulses Carotid] : carotid pulses were normal with no bruits [Heart Sounds Pericardial Friction Rub] : no pericardial rub [Edema] : there was no peripheral edema [Full Pulse] : the pedal pulses are present [Bowel Sounds] : normal bowel sounds [Abdomen Soft] : soft [Abdomen Tenderness] : non-tender [No CVA Tenderness] : no ~M costovertebral angle tenderness [Abdomen Mass (___ Cm)] : no abdominal mass palpated [Abnormal Walk] : normal gait [No Spinal Tenderness] : no spinal tenderness [Nail Clubbing] : no clubbing  or cyanosis of the fingernails [Motor Tone] : muscle strength and tone were normal [Musculoskeletal - Swelling] : no joint swelling seen [Skin Turgor] : normal skin turgor [Skin Color & Pigmentation] : normal skin color and pigmentation [] : no rash [Place] : disoriented to place [Time] : disoriented to time [Span Intact] : the attention span was decreased [Short Term Intact] : short term memory impaired [Current Events] : inadequate knowledge of current events [Romberg's Sign] : Romberg's sign was negtive [Allodynia] : no ~T allodynia present [Dysesthesia] : no dysesthesia [Limited Balance] : balance was intact [Hyperesthesia] : no hyperesthesia [Past-pointing] : there was no past-pointing [Tremor] : no tremor present [Dysdiadochokinesia Bilaterally] : not present [Plantar Reflex Right Only] : normal on the right [Coordination - Dysmetria Impaired Heel-to-Shin Bilateral] : not present [Coordination - Dysmetria Impaired Finger-to-Nose Bilateral] : not present [Plantar Reflex Left Only] : normal on the left [FreeTextEntry4] : Presidents: 0/5.\par Alternating Pattern: ok\par Clock: 2/3, can read clocks\par Luria: ok, forgets pattern\par Spiral: ok\par Knows R/L on self and examiner.\par X-line commands: ok\par Praxia: ok. [FreeTextEntry8] : very mild limp on LLE due to recent ankle sprain [FreeTextEntry6] : Mildly reduced swing in RUE [FreeTextEntry1] : darker discoloration on L ankle

## 2020-07-30 RX ORDER — BUPROPION HYDROCHLORIDE 100 MG/1
100 TABLET, FILM COATED, EXTENDED RELEASE ORAL
Qty: 90 | Refills: 3 | Status: DISCONTINUED | COMMUNITY
Start: 2019-08-05 | End: 2020-07-30

## 2020-08-31 ENCOUNTER — RX RENEWAL (OUTPATIENT)
Age: 85
End: 2020-08-31

## 2020-09-21 DIAGNOSIS — R45.1 RESTLESSNESS AND AGITATION: ICD-10-CM

## 2020-09-21 RX ORDER — QUETIAPINE FUMARATE 25 MG/1
25 TABLET ORAL TWICE DAILY
Qty: 30 | Refills: 2 | Status: DISCONTINUED | COMMUNITY
Start: 2020-07-30 | End: 2020-09-21

## 2020-09-21 RX ORDER — SERTRALINE 25 MG/1
25 TABLET, FILM COATED ORAL DAILY
Qty: 30 | Refills: 3 | Status: DISCONTINUED | COMMUNITY
Start: 2020-08-31 | End: 2020-09-21

## 2020-10-09 ENCOUNTER — RX RENEWAL (OUTPATIENT)
Age: 85
End: 2020-10-09

## 2020-11-10 ENCOUNTER — APPOINTMENT (OUTPATIENT)
Dept: NEUROLOGY | Facility: CLINIC | Age: 85
End: 2020-11-10
Payer: MEDICARE

## 2020-11-10 ENCOUNTER — NON-APPOINTMENT (OUTPATIENT)
Age: 85
End: 2020-11-10

## 2020-11-10 VITALS — HEART RATE: 66 BPM | SYSTOLIC BLOOD PRESSURE: 173 MMHG | DIASTOLIC BLOOD PRESSURE: 102 MMHG

## 2020-11-10 VITALS — TEMPERATURE: 97.7 F

## 2020-11-10 PROCEDURE — 99214 OFFICE O/P EST MOD 30 MIN: CPT

## 2020-11-10 RX ORDER — METOPROLOL TARTRATE 25 MG/1
25 TABLET, FILM COATED ORAL DAILY
Refills: 0 | Status: DISCONTINUED | COMMUNITY
Start: 2020-11-10 | End: 2020-11-10

## 2020-11-10 RX ORDER — AMOXICILLIN AND CLAVULANATE POTASSIUM 875; 125 MG/1; MG/1
875-125 TABLET, COATED ORAL
Qty: 6 | Refills: 0 | Status: COMPLETED | COMMUNITY
Start: 2020-06-23

## 2020-11-10 RX ORDER — TELMISARTAN 80 MG/1
80 TABLET ORAL
Qty: 90 | Refills: 0 | Status: COMPLETED | COMMUNITY
Start: 2020-11-07

## 2020-11-10 RX ORDER — LOSARTAN POTASSIUM 25 MG/1
25 TABLET, FILM COATED ORAL DAILY
Refills: 0 | Status: DISCONTINUED | COMMUNITY
Start: 2017-11-20 | End: 2020-11-10

## 2020-11-10 RX ORDER — AMLODIPINE BESYLATE 2.5 MG/1
2.5 TABLET ORAL DAILY
Refills: 0 | Status: DISCONTINUED | COMMUNITY
Start: 2020-07-08 | End: 2020-11-10

## 2020-11-10 RX ORDER — SULFAMETHOXAZOLE AND TRIMETHOPRIM 800; 160 MG/1; MG/1
800-160 TABLET ORAL
Qty: 14 | Refills: 0 | Status: COMPLETED | COMMUNITY
Start: 2020-09-13

## 2020-11-10 RX ORDER — CALCIUM CITRATE 100 %
POWDER (GRAM) MISCELLANEOUS
Refills: 0 | Status: DISCONTINUED | COMMUNITY
Start: 2017-11-20 | End: 2020-11-10

## 2020-11-10 RX ORDER — MULTIVIT-MIN/FOLIC/VIT K/LYCOP 400-300MCG
25 MCG TABLET ORAL DAILY
Qty: 30 | Refills: 3 | Status: DISCONTINUED | COMMUNITY
Start: 2017-11-20 | End: 2020-11-10

## 2020-11-10 RX ORDER — ATENOLOL 25 MG/1
25 TABLET ORAL DAILY
Refills: 0 | Status: DISCONTINUED | COMMUNITY
Start: 2017-11-20 | End: 2020-11-10

## 2020-11-10 RX ORDER — MULTIVIT-MIN/FA/LYCOPEN/LUTEIN .4-300-25
TABLET ORAL DAILY
Refills: 0 | Status: DISCONTINUED | COMMUNITY
Start: 2017-11-20 | End: 2020-11-10

## 2020-11-10 RX ORDER — DEXTROMETHORPHAN HYDROBROMIDE AND QUINIDINE SULFATE 20; 10 MG/1; MG/1
20-10 CAPSULE, GELATIN COATED ORAL
Qty: 30 | Refills: 3 | Status: DISCONTINUED | COMMUNITY
Start: 2020-09-21 | End: 2020-11-10

## 2020-11-10 RX ORDER — KRILL/OM-3/DHA/EPA/PHOSPHO/AST 1000-230MG
81 CAPSULE ORAL
Refills: 0 | Status: DISCONTINUED | COMMUNITY
Start: 2017-11-20 | End: 2020-11-10

## 2020-11-10 RX ORDER — ATORVASTATIN CALCIUM 10 MG/1
10 TABLET, FILM COATED ORAL
Qty: 90 | Refills: 0 | Status: COMPLETED | COMMUNITY
Start: 2020-01-23

## 2020-11-10 RX ORDER — LOSARTAN POTASSIUM 100 MG/1
100 TABLET, FILM COATED ORAL
Qty: 90 | Refills: 0 | Status: COMPLETED | COMMUNITY
Start: 2020-07-01

## 2020-11-10 RX ORDER — CEFADROXIL 500 MG/1
500 CAPSULE ORAL
Qty: 14 | Refills: 0 | Status: COMPLETED | COMMUNITY
Start: 2020-09-13

## 2020-11-10 NOTE — PHYSICAL EXAM
[Place] : disoriented to place [Time] : disoriented to time [Short Term Intact] : short term memory impaired [Span Intact] : the attention span was decreased [Current Events] : inadequate knowledge of current events [Romberg's Sign] : Romberg's sign was negtive [Allodynia] : no ~T allodynia present [Dysesthesia] : no dysesthesia [Hyperesthesia] : no hyperesthesia [Limited Balance] : balance was intact [Past-pointing] : there was no past-pointing [Tremor] : no tremor present [Dysdiadochokinesia Bilaterally] : not present [Coordination - Dysmetria Impaired Finger-to-Nose Bilateral] : not present [Coordination - Dysmetria Impaired Heel-to-Shin Bilateral] : not present [Plantar Reflex Right Only] : normal on the right [Plantar Reflex Left Only] : normal on the left [FreeTextEntry4] : Presidents: 0/5.\par Alternating Pattern: ok\par Clock: 2/3, can read clocks\par Luria: ok, forgets pattern\par Spiral: ok\par Knows R/L on self and examiner.\par X-line commands: ok\par Praxia: ok. [FreeTextEntry6] : Mildly reduced swing in RUE [FreeTextEntry8] : very mild limp on LLE due to recent ankle sprain [FreeTextEntry1] : darker discoloration on L ankle

## 2020-11-10 NOTE — HISTORY OF PRESENT ILLNESS
[FreeTextEntry1] : HPI-Interval Hx 20201110:\par Pt has changed BP meds, now appears to be only on Metoprolol 25m, daughter to confirm.\par Memory worsening. \par Still restless and picking at her sking, though she is busy during the day. \par In the past she has not tolerated SSRI, which Seroquel did not work.\par Nuedexta no effect. \par Appetite ok, sleeps well, but a lot. \par \par HPI-Interval Hx 20200707:\par pt has been stable, but for the last 2 months family has noted more anxiety and fidgeting with objects and nails. No wounds.\par Sleep: intact. \par Appetite: ok.\par Recent ankle sprain and severe cellulitis, getting better.\par ADL still ok.\par \par HPI-Interval Hx 20200107:\par pt here for follow-up. Overall stable, but per family her STM is worse. Speech and ADL are still fine.\par IADL limited, but she can use [employer], phone. \par Limited activities, some apathy is present.\par Goes to day center 2/week. \par Aricept 15mg, well tolerated.\par Appetite and sleep are fine.\par \par HPI-Interval Hx 20190805:\par Pt overall stable, but apathy is important.\par Per daughter, she goes with her to do activities and exercise, but when she is at home she would sleep all the time.\par Lexapro at 5mg, reduced due to sleepiness, though not the cause.\par Appetite is fine. \par \par HPI-Interval Hx [id number]:\par since starting Namnzaric, per daughter, she is more unsteady, more unsure, more confused. Her PCP dc Lexapro due to weight gain. She also appears more depressed. \par Going to senior center 4 days a week, but not much exercise. \par Appetite is fine. Sleep is maintained. \par per family, gait is a bit slower, but no falls. \par \par HPI-Interval Hx 20181008:\par After the recent passing of her , she has had a set back, of mood and memory. She had lost weight, now getting it back.\par Daughter is very distraught, as she also lost her  1y ago, and s trying to get better.\par Better with Lexapro and Zyprexa.\par \par HPI-Interval Hx [id number]:\par Family unable to get MRI, will call directly. \par Ok on meds, no AE, but has sundowning confusion and gets anxious.\par Physically ok, sleep and appetite are fine. \par \par PMH:\par 87yo RH WW, with HTN, HLD, depression, here for evaluation of memory issues. 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 Started Namzaric, now at max dose. \par \par ADL: ok.\par IADL: limited. can cook and wash house. Does Chores. \par Sleep: ok.\par Appetite: intact, but has lost several lbs in the last few months. \par \par Personality: she might react badly to criticism. \par \par She has been very independent and active until very recently.

## 2020-11-10 NOTE — ASSESSMENT
[FreeTextEntry1] : Assessment:\par 89yo RH WW, with progressive cognitive decline. \par She has not tolerated Namzaric, likely the Memantine component. \par Lexapro reduced, not tolerated, so Sertraline and Venlafaxine.\par Depression is much better since using Wellbutrin, will continue.\par \par Physically fine, no focal neuro signs, no parkinsonism, just slight reduced swing in RUE. \par Recent ankle sprain and cellulitis, improving. \par \par Still restless with some sundowning.\par \par Impression:\par -LOAD\par -Depression\par -Anxiety\par -Apathy\par \par Plan:\par -Cont Aricept 10mg\par -Will try Olanzapine 2.5mg daily and increase if needed. \par -Encourage activities\par -I encouraged her daughter to get help to manage the pt at home.\par \par A thorough discussion was entertained with the patient/caregiver regarding the use of psychoactive medications, their possible benefits and AE profile, including the risk of cardiovascular complications.\par We discussed the benefits of being active, physically and mentally, and the need to to establish a routine in this respect.\par Driving abilities and firearms possession and use were discussed, in relation to progression of the cognitive decline, and the need to assess them periodically.\par Patient/caregiver understand and agree with the plan.\par

## 2020-11-10 NOTE — DATA REVIEWED
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

## 2021-01-28 ENCOUNTER — NON-APPOINTMENT (OUTPATIENT)
Age: 86
End: 2021-01-28

## 2021-02-09 ENCOUNTER — APPOINTMENT (OUTPATIENT)
Dept: NEUROLOGY | Facility: CLINIC | Age: 86
End: 2021-02-09
Payer: MEDICARE

## 2021-02-09 VITALS
HEART RATE: 81 BPM | HEIGHT: 59 IN | SYSTOLIC BLOOD PRESSURE: 177 MMHG | BODY MASS INDEX: 22.18 KG/M2 | WEIGHT: 110 LBS | DIASTOLIC BLOOD PRESSURE: 80 MMHG

## 2021-02-09 VITALS — TEMPERATURE: 97.2 F

## 2021-02-09 PROCEDURE — 99214 OFFICE O/P EST MOD 30 MIN: CPT

## 2021-02-09 NOTE — DATA REVIEWED
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

## 2021-02-09 NOTE — HISTORY OF PRESENT ILLNESS
[FreeTextEntry1] : HPI-Interval Hx 20210209:\par pt has been stable, with some gradual loss of STM, but rest is stable.\par ADL still ok.\par Less agitation, not picking on hair anymore.\par Sleeps and eats well.\par Motor: per daughter, somewhat slower, but no falls.\par \par HPI-Interval Hx 20201110:\par Pt has changed BP meds, now appears to be only on Metoprolol 25m, daughter to confirm.\par Memory worsening. \par Still restless and picking at her sking, though she is busy during the day. \par In the past she has not tolerated SSRI, which Seroquel did not work.\par Nuedexta no effect. \par Appetite ok, sleeps well, but a lot. \par \par HPI-Interval Hx [id number]:\par pt has been stable, but for the last 2 months family has noted more anxiety and fidgeting with objects and nails. No wounds.\par Sleep: intact. \par Appetite: ok.\par Recent ankle sprain and severe cellulitis, getting better.\par ADL still ok.\par \par HPI-Interval Hx [id number]:\par pt here for follow-up. Overall stable, but per family her STM is worse. Speech and ADL are still fine.\par IADL limited, but she can use microwave, phone. \par Limited activities, some apathy is present.\par Goes to day center 2/week. \par Aricept 15mg, well tolerated.\par Appetite and sleep are fine.\par \par HPI-Interval Hx [id number]:\par Pt overall stable, but apathy is important.\par Per daughter, she goes with her to do activities and exercise, but when she is at home she would sleep all the time.\par Lexapro at 5mg, reduced due to sleepiness, though not the cause.\par Appetite is fine. \par \par HPI-Interval Hx [id number]:\par since starting Namnzaric, per daughter, she is more unsteady, more unsure, more confused. Her PCP dc Lexapro due to weight gain. She also appears more depressed. \par Going to senior center 4 days a week, but not much exercise. \par Appetite is fine. Sleep is maintained. \par per family, gait is a bit slower, but no falls. \par \par HPI-Interval Hx [id number]:\par After the recent passing of her , she has had a set back, of mood and memory. She had lost weight, now getting it back.\par Daughter is very distraught, as she also lost her  1y ago, and s trying to get better.\par Better with Lexapro and Zyprexa.\par \par HPI-Interval Hx [id number]:\par Family unable to get MRI, will call directly. \par Ok on meds, no AE, but has sundowning confusion and gets anxious.\par Physically ok, sleep and appetite are fine. \par \par PMH:\par 89yo RH WW, with HTN, HLD, depression, here for evaluation of memory issues. 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 Started Namzaric, now at max dose. \par \par ADL: ok.\par IADL: limited. can cook and wash house. Does Chores. \par Sleep: ok.\par Appetite: intact, but has lost several lbs in the last few months. \par \par Personality: she might react badly to criticism. \par \par She has been very independent and active until very recently.

## 2021-02-09 NOTE — ASSESSMENT
[FreeTextEntry1] : Assessment:\par 89yo RH WW, with progressive cognitive decline. \par Mild STM decline since last seen. Rest is stable.\par Physically fine, no focal neuro signs, no parkinsonism, just slight reduced swing in RUE. \par Calmer with Olanzapine. \par Her BP still high, artis ee cardiologist, soon.\par \par Impression:\par -LOAD\par -anxiety\par -agitation \par \par \par Plan:\par -Cont Aricept 10mg\par -Continue Olanzapine \par -add Namenda 7mg ER - last time tried, she was upset about , we'll try again\par -Encourage activities\par \par A thorough discussion was entertained with the patient/caregiver regarding the use of psychoactive medications, their possible benefits and AE profile, including the risk of cardiovascular complications.\par We discussed the benefits of being active, physically and mentally, and the need to to establish a routine in this respect.\par Driving abilities and firearms possession and use were discussed, in relation to progression of the cognitive decline, and the need to assess them periodically.\par Patient/caregiver understand and agree with the plan.\par

## 2021-02-09 NOTE — PHYSICAL EXAM
[General Appearance - Alert] : alert [General Appearance - In No Acute Distress] : in no acute distress [Impaired Insight] : insight and judgment were intact [Affect] : the affect was normal [Person] : oriented to person [Remote Intact] : remote memory intact [Registration Intact] : recent registration memory intact [Concentration Intact] : normal concentrating ability [Visual Intact] : visual attention was ~T not ~L decreased [Naming Objects] : no difficulty naming common objects [Repeating Phrases] : no difficulty repeating a phrase [Writing A Sentence] : no difficulty writing a sentence [Fluency] : fluency intact [Comprehension] : comprehension intact [Reading] : reading intact [Past History] : adequate knowledge of personal past history [Vocabulary] : adequate range of vocabulary [Total Score ___ / 30] : the patient achieved a score of [unfilled] /30 [Date / Time ___ / 5] : date / time [unfilled] / 5 [Place ___ / 5] : place [unfilled] / 5 [Registration ___ / 3] : registration [unfilled] / 3 [Serial Sevens ___/5] : serial sevens [unfilled] / 5 [Naming 2 Objects ___ / 2] : naming two objects [unfilled] / 2 [Repeating a Sentence ___ / 1] : repeating a sentence [unfilled] / 1 [Writing a Sentence ___ / 1] : write sentence [unfilled] / 1 [3-stage Verbal Command ___ / 3] : three-stage verbal command [unfilled] / 3 [Written Command ___ / 1] : written command [unfilled] / 1 [Copy a Design ___ / 1] : copy a design [unfilled] / 1 [Recall ___ / 3] : recall [unfilled] / 3 [Cranial Nerves Optic (II)] : visual acuity intact bilaterally,  visual fields full to confrontation, pupils equal round and reactive to light [Cranial Nerves Oculomotor (III)] : extraocular motion intact [Cranial Nerves Trigeminal (V)] : facial sensation intact symmetrically [Cranial Nerves Facial (VII)] : face symmetrical [Cranial Nerves Vestibulocochlear (VIII)] : hearing was intact bilaterally [Cranial Nerves Glossopharyngeal (IX)] : tongue and palate midline [Cranial Nerves Accessory (XI - Cranial And Spinal)] : head turning and shoulder shrug symmetric [Cranial Nerves Hypoglossal (XII)] : there was no tongue deviation with protrusion [Motor Strength] : muscle strength was normal in all four extremities [Involuntary Movements] : no involuntary movements were seen [No Muscle Atrophy] : normal bulk in all four extremities [Motor Handedness Right-Handed] : the patient is right hand dominant [Sensation Tactile Decrease] : light touch was intact [Sensation Pain / Temperature Decrease] : pain and temperature was intact [Sensation Vibration Decrease] : vibration was intact [Proprioception] : proprioception was intact [Balance] : balance was intact [2+] : Brachioradialis left 2+ [1+] : Ankle jerk left 1+ [Sclera] : the sclera and conjunctiva were normal [Extraocular Movements] : extraocular movements were intact [PERRL With Normal Accommodation] : pupils were equal in size, round, reactive to light, with normal accommodation [Optic Disc Abnormality] : the optic disc were normal in size and color [No APD] : no afferent pupillary defect [No JOSE] : no internuclear ophthalmoplegia [Full Visual Field] : full visual field [Outer Ear] : the ears and nose were normal in appearance [Oropharynx] : the oropharynx was normal [Neck Appearance] : the appearance of the neck was normal [Neck Cervical Mass (___cm)] : no neck mass was observed [Jugular Venous Distention Increased] : there was no jugular-venous distention [Thyroid Diffuse Enlargement] : the thyroid was not enlarged [Thyroid Nodule] : there were no palpable thyroid nodules [Auscultation Breath Sounds / Voice Sounds] : lungs were clear to auscultation bilaterally [Heart Rate And Rhythm] : heart rate was normal and rhythm regular [Heart Sounds] : normal S1 and S2 [Heart Sounds Gallop] : no gallops [Heart Sounds Pericardial Friction Rub] : no pericardial rub [Murmurs] : no murmurs [Arterial Pulses Carotid] : carotid pulses were normal with no bruits [Full Pulse] : the pedal pulses are present [Edema] : there was no peripheral edema [Abdomen Soft] : soft [Bowel Sounds] : normal bowel sounds [Abdomen Tenderness] : non-tender [Abdomen Mass (___ Cm)] : no abdominal mass palpated [No CVA Tenderness] : no ~M costovertebral angle tenderness [No Spinal Tenderness] : no spinal tenderness [Abnormal Walk] : normal gait [Nail Clubbing] : no clubbing  or cyanosis of the fingernails [Motor Tone] : muscle strength and tone were normal [Musculoskeletal - Swelling] : no joint swelling seen [Skin Color & Pigmentation] : normal skin color and pigmentation [Skin Turgor] : normal skin turgor [] : no rash [Place] : disoriented to place [Time] : disoriented to time [Short Term Intact] : short term memory impaired [Span Intact] : the attention span was decreased [Current Events] : inadequate knowledge of current events [Romberg's Sign] : Romberg's sign was negtive [Dysesthesia] : no dysesthesia [Allodynia] : no ~T allodynia present [Hyperesthesia] : no hyperesthesia [Limited Balance] : balance was intact [Past-pointing] : there was no past-pointing [Tremor] : no tremor present [Dysdiadochokinesia Bilaterally] : not present [Coordination - Dysmetria Impaired Finger-to-Nose Bilateral] : not present [Coordination - Dysmetria Impaired Heel-to-Shin Bilateral] : not present [Plantar Reflex Right Only] : normal on the right [Plantar Reflex Left Only] : normal on the left [FreeTextEntry6] : Mildly reduced swing in RUE [FreeTextEntry4] : Presidents: 0/5.\par Alternating Pattern: ok\par Clock: 2/3, can read clocks\par Luria: ok, forgets pattern\par Spiral: ok\par Knows R/L on self and examiner.\par X-line commands: ok\par Praxia: ok. [FreeTextEntry8] : very mild limp on LLE due to recent ankle sprain [FreeTextEntry1] : darker discoloration on L ankle

## 2021-03-08 ENCOUNTER — RX CHANGE (OUTPATIENT)
Age: 86
End: 2021-03-08

## 2021-03-10 ENCOUNTER — RX RENEWAL (OUTPATIENT)
Age: 86
End: 2021-03-10

## 2021-05-18 ENCOUNTER — APPOINTMENT (OUTPATIENT)
Dept: NEUROLOGY | Facility: CLINIC | Age: 86
End: 2021-05-18
Payer: MEDICARE

## 2021-05-18 VITALS
HEIGHT: 59 IN | WEIGHT: 165 LBS | DIASTOLIC BLOOD PRESSURE: 90 MMHG | HEART RATE: 165 BPM | BODY MASS INDEX: 33.26 KG/M2 | SYSTOLIC BLOOD PRESSURE: 149 MMHG

## 2021-05-18 PROCEDURE — 99214 OFFICE O/P EST MOD 30 MIN: CPT

## 2021-05-18 NOTE — HISTORY OF PRESENT ILLNESS
[FreeTextEntry1] : HPI-Interval Hx [id number]:\par pt has been more irritable lately, more anxious, tends to cry.\par Sleep and appetite ok.\par Gait stable.\par Rest is stable.\par \par HPI-Interval Hx 20210209:\par pt has been stable, with some gradual loss of STM, but rest is stable.\par ADL still ok.\par Less agitation, not picking on hair anymore.\par Sleeps and eats well.\par Motor: per daughter, somewhat slower, but no falls.\par \par HPI-Interval Hx 20201110:\par Pt has changed BP meds, now appears to be only on Metoprolol 25m, daughter to confirm.\par Memory worsening. \par Still restless and picking at her sking, though she is busy during the day. \par In the past she has not tolerated SSRI, which Seroquel did not work.\par Nuedexta no effect. \par Appetite ok, sleeps well, but a lot. \par \par HPI-Interval Hx 20200707:\par pt has been stable, but for the last 2 months family has noted more anxiety and fidgeting with objects and nails. No wounds.\par Sleep: intact. \par Appetite: ok.\par Recent ankle sprain and severe cellulitis, getting better.\par ADL still ok.\par \par HPI-Interval Hx 20200107:\par pt here for follow-up. Overall stable, but per family her STM is worse. Speech and ADL are still fine.\par IADL limited, but she can use microwave, phone. \par Limited activities, some apathy is present.\par Goes to day center 2/week. \par Aricept 15mg, well tolerated.\par Appetite and sleep are fine.\par \par HPI-Interval Hx [id number]:\par Pt overall stable, but apathy is important.\par Per daughter, she goes with her to do activities and exercise, but when she is at home she would sleep all the time.\par Lexapro at 5mg, reduced due to sleepiness, though not the cause.\par Appetite is fine. \par \par HPI-Interval Hx [id number]:\par since starting Namnzaric, per daughter, she is more unsteady, more unsure, more confused. Her PCP dc Lexapro due to weight gain. She also appears more depressed. \par Going to senior center 4 days a week, but not much exercise. \par Appetite is fine. Sleep is maintained. \par per family, gait is a bit slower, but no falls. \par \par HPI-Interval Hx [id number]:\par After the recent passing of her , she has had a set back, of mood and memory. She had lost weight, now getting it back.\par Daughter is very distraught, as she also lost her  1y ago, and s trying to get better.\par Better with Lexapro and Zyprexa.\par \par HPI-Interval Hx [id number]:\par Family unable to get MRI, will call directly. \par Ok on meds, no AE, but has sundowning confusion and gets anxious.\par Physically ok, sleep and appetite are fine. \par \par PMH:\par 87yo RH WW, with HTN, HLD, depression, here for evaluation of memory issues. 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 Started Namzaric, now at max dose. \par \par ADL: ok.\par IADL: limited. can cook and wash house. Does Chores. \par Sleep: ok.\par Appetite: intact, but has lost several lbs in the last few months. \par \par Personality: she might react badly to criticism. \par \par She has been very independent and active until very recently.

## 2021-05-18 NOTE — ASSESSMENT
[FreeTextEntry1] : Assessment:\par 89yo RH WW, with progressive cognitive decline. \par Physically fine, no focal neuro signs, no parkinsonism, just slight reduced swing in RUE. \par Calmer with Olanzapine, but still anxious and somehow depressed. \par \par Impression:\par -LOAD\par -anxiety/depression\par -agitation \par \par Plan:\par -Cont Aricept 10mg\par -Continue Olanzapine 2.5mg BID\par -Add Prozac 10mg (she has not tolerated other AD)\par -add Namenda 7mg ER - last time tried, she was upset about , we'll try again\par -Encourage activities.\par \par A thorough discussion was entertained with the patient/caregiver regarding the use of psychoactive medications, their possible benefits and AE profile, including the risk of cardiovascular complications.\par We discussed the benefits of being active, physically and mentally, and the need to to establish a routine in this respect.\par Driving abilities and firearms possession and use were discussed, in relation to progression of the cognitive decline, and the need to assess them periodically.\par Patient/caregiver understand and agree with the plan.\par

## 2021-05-18 NOTE — DATA REVIEWED
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

## 2021-05-18 NOTE — PHYSICAL EXAM
[General Appearance - Alert] : alert [General Appearance - In No Acute Distress] : in no acute distress [Affect] : the affect was normal [Impaired Insight] : insight and judgment were intact [Person] : oriented to person [Remote Intact] : remote memory intact [Registration Intact] : recent registration memory intact [Concentration Intact] : normal concentrating ability [Visual Intact] : visual attention was ~T not ~L decreased [Naming Objects] : no difficulty naming common objects [Repeating Phrases] : no difficulty repeating a phrase [Writing A Sentence] : no difficulty writing a sentence [Fluency] : fluency intact [Comprehension] : comprehension intact [Reading] : reading intact [Past History] : adequate knowledge of personal past history [Vocabulary] : adequate range of vocabulary [Total Score ___ / 30] : the patient achieved a score of [unfilled] /30 [Date / Time ___ / 5] : date / time [unfilled] / 5 [Place ___ / 5] : place [unfilled] / 5 [Registration ___ / 3] : registration [unfilled] / 3 [Serial Sevens ___/5] : serial sevens [unfilled] / 5 [Naming 2 Objects ___ / 2] : naming two objects [unfilled] / 2 [Repeating a Sentence ___ / 1] : repeating a sentence [unfilled] / 1 [Writing a Sentence ___ / 1] : write sentence [unfilled] / 1 [3-stage Verbal Command ___ / 3] : three-stage verbal command [unfilled] / 3 [Written Command ___ / 1] : written command [unfilled] / 1 [Copy a Design ___ / 1] : copy a design [unfilled] / 1 [Recall ___ / 3] : recall [unfilled] / 3 [Cranial Nerves Optic (II)] : visual acuity intact bilaterally,  visual fields full to confrontation, pupils equal round and reactive to light [Cranial Nerves Oculomotor (III)] : extraocular motion intact [Cranial Nerves Trigeminal (V)] : facial sensation intact symmetrically [Cranial Nerves Vestibulocochlear (VIII)] : hearing was intact bilaterally [Cranial Nerves Facial (VII)] : face symmetrical [Cranial Nerves Glossopharyngeal (IX)] : tongue and palate midline [Cranial Nerves Accessory (XI - Cranial And Spinal)] : head turning and shoulder shrug symmetric [Cranial Nerves Hypoglossal (XII)] : there was no tongue deviation with protrusion [Motor Strength] : muscle strength was normal in all four extremities [Involuntary Movements] : no involuntary movements were seen [No Muscle Atrophy] : normal bulk in all four extremities [Motor Handedness Right-Handed] : the patient is right hand dominant [Sensation Tactile Decrease] : light touch was intact [Sensation Pain / Temperature Decrease] : pain and temperature was intact [Sensation Vibration Decrease] : vibration was intact [Proprioception] : proprioception was intact [Balance] : balance was intact [2+] : Brachioradialis left 2+ [1+] : Ankle jerk left 1+ [Sclera] : the sclera and conjunctiva were normal [PERRL With Normal Accommodation] : pupils were equal in size, round, reactive to light, with normal accommodation [Optic Disc Abnormality] : the optic disc were normal in size and color [Extraocular Movements] : extraocular movements were intact [No APD] : no afferent pupillary defect [No JOSE] : no internuclear ophthalmoplegia [Full Visual Field] : full visual field [Outer Ear] : the ears and nose were normal in appearance [Oropharynx] : the oropharynx was normal [Neck Appearance] : the appearance of the neck was normal [Neck Cervical Mass (___cm)] : no neck mass was observed [Jugular Venous Distention Increased] : there was no jugular-venous distention [Thyroid Diffuse Enlargement] : the thyroid was not enlarged [Thyroid Nodule] : there were no palpable thyroid nodules [Auscultation Breath Sounds / Voice Sounds] : lungs were clear to auscultation bilaterally [Heart Rate And Rhythm] : heart rate was normal and rhythm regular [Heart Sounds] : normal S1 and S2 [Heart Sounds Gallop] : no gallops [Murmurs] : no murmurs [Heart Sounds Pericardial Friction Rub] : no pericardial rub [Arterial Pulses Carotid] : carotid pulses were normal with no bruits [Full Pulse] : the pedal pulses are present [Bowel Sounds] : normal bowel sounds [Edema] : there was no peripheral edema [Abdomen Soft] : soft [Abdomen Tenderness] : non-tender [Abdomen Mass (___ Cm)] : no abdominal mass palpated [No CVA Tenderness] : no ~M costovertebral angle tenderness [No Spinal Tenderness] : no spinal tenderness [Abnormal Walk] : normal gait [Musculoskeletal - Swelling] : no joint swelling seen [Nail Clubbing] : no clubbing  or cyanosis of the fingernails [Motor Tone] : muscle strength and tone were normal [Skin Turgor] : normal skin turgor [Skin Color & Pigmentation] : normal skin color and pigmentation [] : no rash [Place] : disoriented to place [Time] : disoriented to time [Short Term Intact] : short term memory impaired [Span Intact] : the attention span was decreased [Current Events] : inadequate knowledge of current events [Romberg's Sign] : Romberg's sign was negtive [Allodynia] : no ~T allodynia present [Dysesthesia] : no dysesthesia [Hyperesthesia] : no hyperesthesia [Limited Balance] : balance was intact [Past-pointing] : there was no past-pointing [Tremor] : no tremor present [Dysdiadochokinesia Bilaterally] : not present [Coordination - Dysmetria Impaired Finger-to-Nose Bilateral] : not present [Coordination - Dysmetria Impaired Heel-to-Shin Bilateral] : not present [Plantar Reflex Right Only] : normal on the right [Plantar Reflex Left Only] : normal on the left [FreeTextEntry4] : Presidents: 0/5.\par Alternating Pattern: ok\par Clock: 2/3, can read clocks\par Luria: ok, forgets pattern\par Spiral: ok\par Knows R/L on self and examiner.\par X-line commands: ok\par Praxia: ok. [FreeTextEntry6] : Mildly reduced swing in RUE [FreeTextEntry8] : very mild limp on LLE due to recent ankle sprain [FreeTextEntry1] : darker discoloration on L ankle

## 2021-06-08 ENCOUNTER — NON-APPOINTMENT (OUTPATIENT)
Age: 86
End: 2021-06-08

## 2021-08-10 ENCOUNTER — RX RENEWAL (OUTPATIENT)
Age: 86
End: 2021-08-10

## 2021-09-21 ENCOUNTER — APPOINTMENT (OUTPATIENT)
Dept: NEUROLOGY | Facility: CLINIC | Age: 86
End: 2021-09-21
Payer: MEDICARE

## 2021-09-21 VITALS
SYSTOLIC BLOOD PRESSURE: 145 MMHG | DIASTOLIC BLOOD PRESSURE: 73 MMHG | HEART RATE: 70 BPM | HEIGHT: 59 IN | WEIGHT: 115 LBS | BODY MASS INDEX: 23.18 KG/M2

## 2021-09-21 DIAGNOSIS — I49.9 CARDIAC ARRHYTHMIA, UNSPECIFIED: ICD-10-CM

## 2021-09-21 PROCEDURE — 99214 OFFICE O/P EST MOD 30 MIN: CPT

## 2021-09-23 NOTE — ASSESSMENT
[FreeTextEntry1] : Assessment:\par 89yo RH WW, with progressive cognitive decline. \par Physically fine, no focal neuro signs, no parkinsonism, just slight reduced swing in RUE. \par Slower gait, and seems to be deconditioned in LE, L>R knees pain.\par Calmer with Olanzapine, but still anxious and somehow depressed. \par \par Impression:\par -LOAD\par -anxiety/depression\par -agitation \par \par Plan:\par -Cont Aricept 10mg\par -Continue Olanzapine 2.5mg qhs\par -AI would like to increase Prozac to 20mg, but due to concern of interaction with Amiodarone (recently started by Dr. Alfaro, and QTc elevation) will have to discuss with cardio; if needed, will change Rx\par -Namenda 7mg ER\par -Encourage activities-prefers not to do PT.\par \par A thorough discussion was entertained with the patient/caregiver regarding the use of psychoactive medications, their possible benefits and AE profile, including the risk of cardiovascular complications.\par We discussed the benefits of being active, physically and mentally, and the need to to establish a routine in this respect.\par Driving abilities and firearms possession and use were discussed, in relation to progression of the cognitive decline, and the need to assess them periodically.\par Patient/caregiver understand and agree with the plan.\par

## 2021-09-23 NOTE — DATA REVIEWED
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

## 2021-09-23 NOTE — HISTORY OF PRESENT ILLNESS
[FreeTextEntry1] : COVID VACCINE FULL.\par \par HPI-Interval Hx 20210921:\par She was admitted to hospital for tachycardia (176) on 5/20, started on Amiodarone and loop recorder put in. pt was asymptomatic. Possible AFIB with HVR.Ok to reduce olanzapine to 1tab QHS only since pt was too sleepy.\par \par Since last seen, more decline, lost some ADL, e.g. brushing teeth. Now she needs to be followed tightly and coached.\par She tends to get nervous at night and cry.\par More repetitive now.\par Appetite a bit less, but weight is stable.\par Sleep stable, though in the last few days she is getting up a lot to check the doors. \par \par Rest is stable.\par \par HPI-Interval Hx [id number]:\par pt has been more irritable lately, more anxious, tends to cry.\par Sleep and appetite ok.\par Gait stable.\par Rest is stable.\par \par HPI-Interval Hx [id number]:\par pt has been stable, with some gradual loss of STM, but rest is stable.\par ADL still ok.\par Less agitation, not picking on hair anymore.\par Sleeps and eats well.\par Motor: per daughter, somewhat slower, but no falls.\par \par HPI-Interval Hx 20201110:\par Pt has changed BP meds, now appears to be only on Metoprolol 25m, daughter to confirm.\par Memory worsening. \par Still restless and picking at her sking, though she is busy during the day. \par In the past she has not tolerated SSRI, which Seroquel did not work.\par Nuedexta no effect. \par Appetite ok, sleeps well, but a lot. \par \par HPI-Interval Hx [id number]:\par pt has been stable, but for the last 2 months family has noted more anxiety and fidgeting with objects and nails. No wounds.\par Sleep: intact. \par Appetite: ok.\par Recent ankle sprain and severe cellulitis, getting better.\par ADL still ok.\par \par HPI-Interval Hx [id number]:\par pt here for follow-up. Overall stable, but per family her STM is worse. Speech and ADL are still fine.\par IADL limited, but she can use microwave, phone. \par Limited activities, some apathy is present.\par Goes to day center 2/week. \par Aricept 15mg, well tolerated.\par Appetite and sleep are fine.\par \par HPI-Interval Hx [id number]:\par Pt overall stable, but apathy is important.\par Per daughter, she goes with her to do activities and exercise, but when she is at home she would sleep all the time.\par Lexapro at 5mg, reduced due to sleepiness, though not the cause.\par Appetite is fine. \par \par HPI-Interval Hx [id number]:\par since starting Namnzaric, per daughter, she is more unsteady, more unsure, more confused. Her PCP dc Lexapro due to weight gain. She also appears more depressed. \par Going to senior center 4 days a week, but not much exercise. \par Appetite is fine. Sleep is maintained. \par per family, gait is a bit slower, but no falls. \par \par HPI-Interval Hx [id number]:\par After the recent passing of her , she has had a set back, of mood and memory. She had lost weight, now getting it back.\par Daughter is very distraught, as she also lost her  1y ago, and s trying to get better.\par Better with Lexapro and Zyprexa.\par \par HPI-Interval Hx [id number]:\par Family unable to get MRI, will call directly. \par Ok on meds, no AE, but has sundowning confusion and gets anxious.\par Physically ok, sleep and appetite are fine. \par \par PMH:\par 87yo RH WW, with HTN, HLD, depression, here for evaluation of memory issues. 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 Started Namzaric, now at max dose. \par \par ADL: ok.\par IADL: limited. can cook and wash house. Does Chores. \par Sleep: ok.\par Appetite: intact, but has lost several lbs in the last few months. \par \par Personality: she might react badly to criticism. \par \par She has been very independent and active until very recently.

## 2021-09-23 NOTE — ADDENDUM
[FreeTextEntry1] : 9/22/2021:\par I reviewed an EKG from 7/2020, QTc is elevated. I am awaiting to speak with Dr. Alfaro but unlikely to be able to raise Rx, will have to change strategy.\par \par 9/23/2021:\par I spoke with her cardiologist, questionable reliability of the 7/20 EKG. Will repeat soon and get back to me Re increasing Fluoxetine.

## 2021-09-23 NOTE — PHYSICAL EXAM
[General Appearance - Alert] : alert [General Appearance - In No Acute Distress] : in no acute distress [Impaired Insight] : insight and judgment were intact [Affect] : the affect was normal [Person] : oriented to person [Remote Intact] : remote memory intact [Registration Intact] : recent registration memory intact [Concentration Intact] : normal concentrating ability [Visual Intact] : visual attention was ~T not ~L decreased [Naming Objects] : no difficulty naming common objects [Fluency] : fluency intact [Comprehension] : comprehension intact [Reading] : reading intact [Past History] : adequate knowledge of personal past history [Vocabulary] : adequate range of vocabulary [Total Score ___ / 30] : the patient achieved a score of [unfilled] /30 [Date / Time ___ / 5] : date / time [unfilled] / 5 [Place ___ / 5] : place [unfilled] / 5 [Registration ___ / 3] : registration [unfilled] / 3 [Serial Sevens ___/5] : serial sevens [unfilled] / 5 [Naming 2 Objects ___ / 2] : naming two objects [unfilled] / 2 [Repeating a Sentence ___ / 1] : repeating a sentence [unfilled] / 1 [Writing a Sentence ___ / 1] : write sentence [unfilled] / 1 [3-stage Verbal Command ___ / 3] : three-stage verbal command [unfilled] / 3 [Written Command ___ / 1] : written command [unfilled] / 1 [Copy a Design ___ / 1] : copy a design [unfilled] / 1 [Recall ___ / 3] : recall [unfilled] / 3 [Cranial Nerves Oculomotor (III)] : extraocular motion intact [Cranial Nerves Optic (II)] : visual acuity intact bilaterally,  visual fields full to confrontation, pupils equal round and reactive to light [Cranial Nerves Trigeminal (V)] : facial sensation intact symmetrically [Cranial Nerves Facial (VII)] : face symmetrical [Cranial Nerves Vestibulocochlear (VIII)] : hearing was intact bilaterally [Cranial Nerves Glossopharyngeal (IX)] : tongue and palate midline [Cranial Nerves Accessory (XI - Cranial And Spinal)] : head turning and shoulder shrug symmetric [Cranial Nerves Hypoglossal (XII)] : there was no tongue deviation with protrusion [Motor Strength] : muscle strength was normal in all four extremities [Involuntary Movements] : no involuntary movements were seen [No Muscle Atrophy] : normal bulk in all four extremities [Motor Handedness Right-Handed] : the patient is right hand dominant [Sensation Tactile Decrease] : light touch was intact [Sensation Pain / Temperature Decrease] : pain and temperature was intact [Sensation Vibration Decrease] : vibration was intact [Proprioception] : proprioception was intact [Balance] : balance was intact [2+] : Brachioradialis right 2+ [1+] : Ankle jerk left 1+ [Sclera] : the sclera and conjunctiva were normal [PERRL With Normal Accommodation] : pupils were equal in size, round, reactive to light, with normal accommodation [Extraocular Movements] : extraocular movements were intact [Optic Disc Abnormality] : the optic disc were normal in size and color [No APD] : no afferent pupillary defect [No JOSE] : no internuclear ophthalmoplegia [Full Visual Field] : full visual field [Outer Ear] : the ears and nose were normal in appearance [Neck Appearance] : the appearance of the neck was normal [Oropharynx] : the oropharynx was normal [Neck Cervical Mass (___cm)] : no neck mass was observed [Jugular Venous Distention Increased] : there was no jugular-venous distention [Thyroid Nodule] : there were no palpable thyroid nodules [Thyroid Diffuse Enlargement] : the thyroid was not enlarged [Auscultation Breath Sounds / Voice Sounds] : lungs were clear to auscultation bilaterally [Heart Rate And Rhythm] : heart rate was normal and rhythm regular [Heart Sounds] : normal S1 and S2 [Heart Sounds Gallop] : no gallops [Murmurs] : no murmurs [Heart Sounds Pericardial Friction Rub] : no pericardial rub [Arterial Pulses Carotid] : carotid pulses were normal with no bruits [Full Pulse] : the pedal pulses are present [Edema] : there was no peripheral edema [Abdomen Soft] : soft [Bowel Sounds] : normal bowel sounds [Abdomen Tenderness] : non-tender [Abdomen Mass (___ Cm)] : no abdominal mass palpated [No CVA Tenderness] : no ~M costovertebral angle tenderness [No Spinal Tenderness] : no spinal tenderness [Nail Clubbing] : no clubbing  or cyanosis of the fingernails [Musculoskeletal - Swelling] : no joint swelling seen [Motor Tone] : muscle strength and tone were normal [Skin Color & Pigmentation] : normal skin color and pigmentation [Skin Turgor] : normal skin turgor [] : no rash [Abnormal Walk] : normal gait [Place] : disoriented to place [Time] : disoriented to time [Short Term Intact] : short term memory impaired [Span Intact] : the attention span was decreased [Repeating Phrases] : difficulty repeating a phrase [Writing A Sentence] : difficulty writing a sentence [Current Events] : inadequate knowledge of current events [Romberg's Sign] : Romberg's sign was negtive [Allodynia] : no ~T allodynia present [Dysesthesia] : no dysesthesia [Hyperesthesia] : no hyperesthesia [Limited Balance] : balance was intact [Past-pointing] : there was no past-pointing [Tremor] : no tremor present [Dysdiadochokinesia Bilaterally] : not present [Coordination - Dysmetria Impaired Finger-to-Nose Bilateral] : not present [Coordination - Dysmetria Impaired Heel-to-Shin Bilateral] : not present [Plantar Reflex Right Only] : normal on the right [Plantar Reflex Left Only] : normal on the left [FreeTextEntry4] : Presidents: 0/5.\par Alternating Pattern: ok\par Clock: 1/3, can read clocks\par Luria: ok, forgets pattern\par Spiral: ok\par Knows R/L on self and examiner.\par X-line commands: ok\par Praxia: ok. [FreeTextEntry6] : Mildly reduced swing in RUE [FreeTextEntry8] : a bit slower and tends to hold hands for safety. [FreeTextEntry1] : deconditioned, seems to have lower energy and strength in knees.

## 2021-09-28 RX ORDER — AMIODARONE HYDROCHLORIDE 200 MG/1
200 TABLET ORAL DAILY
Refills: 0 | Status: DISCONTINUED | COMMUNITY
Start: 2021-09-21 | End: 2021-09-28

## 2021-11-03 ENCOUNTER — RX RENEWAL (OUTPATIENT)
Age: 86
End: 2021-11-03

## 2021-11-09 ENCOUNTER — RX RENEWAL (OUTPATIENT)
Age: 86
End: 2021-11-09

## 2021-11-17 ENCOUNTER — RX RENEWAL (OUTPATIENT)
Age: 86
End: 2021-11-17

## 2021-11-23 ENCOUNTER — APPOINTMENT (OUTPATIENT)
Dept: PULMONOLOGY | Facility: CLINIC | Age: 86
End: 2021-11-23
Payer: MEDICARE

## 2021-11-23 DIAGNOSIS — Z20.822 CONTACT WITH AND (SUSPECTED) EXPOSURE TO COVID-19: ICD-10-CM

## 2021-11-23 PROCEDURE — 99205 OFFICE O/P NEW HI 60 MIN: CPT | Mod: CS,95

## 2021-11-24 ENCOUNTER — LABORATORY RESULT (OUTPATIENT)
Age: 86
End: 2021-11-24

## 2021-11-24 ENCOUNTER — TRANSCRIPTION ENCOUNTER (OUTPATIENT)
Age: 86
End: 2021-11-24

## 2021-11-24 ENCOUNTER — NON-APPOINTMENT (OUTPATIENT)
Age: 86
End: 2021-11-24

## 2021-11-24 PROBLEM — Z20.822 CLOSE EXPOSURE TO COVID-19 VIRUS: Status: ACTIVE | Noted: 2021-11-24

## 2021-11-26 ENCOUNTER — NON-APPOINTMENT (OUTPATIENT)
Age: 86
End: 2021-11-26

## 2021-11-26 ENCOUNTER — APPOINTMENT (OUTPATIENT)
Dept: DISASTER EMERGENCY | Facility: HOSPITAL | Age: 86
End: 2021-11-26

## 2021-11-28 NOTE — HISTORY OF PRESENT ILLNESS
[Never] : never [Home] : at home, [unfilled] , at the time of the visit. [Medical Office: ([facility])___] : at the medical office located in  [TextBox_4] : This visit was provided via telehealth using real-time 2-way audio visual technology. The patient, UZAIR MOMIN , was located at home, [address]\[place] [address]  at the time of the visit.\par The provider, Davon Motta, was located at office [facility] [place], [place] at the time of the visit. \par \par  The patient, Ms. UZAIR MOIMN  and Physician Davon Jarquin participated in the telehealth encounter.\par \par Verbal consent obtained by  from patient\par \par Significant exposure COVID\par \par Cough started yesterday. No SOB.  No chest discomfort.\par No loss of taste or smell.\par Vaccinated and Booster Moderna\par Sat 94%.\par No other symptoms.\par Daughter has COVID and is living with her.\par No fever. \par \par Comorbidities Dementia, HTH, Arrythmia, Hyperlipidemia.\par \par No swab.

## 2021-11-29 ENCOUNTER — APPOINTMENT (OUTPATIENT)
Dept: PULMONOLOGY | Facility: CLINIC | Age: 86
End: 2021-11-29
Payer: MEDICARE

## 2021-11-29 DIAGNOSIS — U07.1 COVID-19: ICD-10-CM

## 2021-11-29 PROCEDURE — 99213 OFFICE O/P EST LOW 20 MIN: CPT | Mod: CS,95

## 2021-11-30 PROBLEM — U07.1 COVID-19 VIRUS INFECTION: Status: ACTIVE | Noted: 2021-11-28

## 2021-11-30 NOTE — DISCUSSION/SUMMARY
[FreeTextEntry1] : COVID virus infection.  Doing well.\par Status post monoclonal antibody.\par Comorbidity dementia, hypertension, arrhythmia, hyperlipidemia.

## 2021-11-30 NOTE — ASSESSMENT
[FreeTextEntry1] : Baby ASA for 30 days.\par Pepcid 20 BID while on aspirin.\par Vitamin D 2000 units/day\par Complete 10 days of rest home and isolate. \par Close observation.\par Follow O2 saturation.\par Call or follow-up as soon as possible if increased respiratory symptoms.\par \par

## 2021-11-30 NOTE — HISTORY OF PRESENT ILLNESS
[Never] : never [Home] : at home, [unfilled] , at the time of the visit. [Medical Office: ([facility])___] : at the medical office located in  [TextBox_4] : This visit was provided via telehealth using real-time 2-way audio visual technology. The patient, UZAIR MOMIN , was located at home, [address]\[place] [address]  at the time of the visit.\par The provider, Davon Motta, was located at office [facility] [place], [place] at the time of the visit. \par \par  The patient, Ms. UZAIR MOMIN  and Physician Davon Jarquin participated in the telehealth encounter.\par \par Verbal consent obtained by  from patient\par \par Covid positive on PCR.\par \par \par Feeling very well\par No symptoms\par Got monoclonal antibody\par No lost of taste or smell. \par Denies cough wheezing chest congestion mucus production chest pain or shortness of breath.\par O2 saturations remain good.

## 2021-12-21 ENCOUNTER — APPOINTMENT (OUTPATIENT)
Dept: NEUROLOGY | Facility: CLINIC | Age: 86
End: 2021-12-21
Payer: MEDICARE

## 2021-12-21 VITALS
SYSTOLIC BLOOD PRESSURE: 135 MMHG | BODY MASS INDEX: 24.6 KG/M2 | WEIGHT: 122 LBS | HEART RATE: 76 BPM | DIASTOLIC BLOOD PRESSURE: 76 MMHG | HEIGHT: 59 IN

## 2021-12-21 DIAGNOSIS — F32.A DEPRESSION, UNSPECIFIED: ICD-10-CM

## 2021-12-21 PROCEDURE — 99214 OFFICE O/P EST MOD 30 MIN: CPT

## 2021-12-21 RX ORDER — METOPROLOL TARTRATE 75 MG/1
TABLET, FILM COATED ORAL
Refills: 0 | Status: DISCONTINUED | COMMUNITY
End: 2021-12-21

## 2021-12-21 NOTE — ASSESSMENT
[FreeTextEntry1] : Assessment:\par 89yo RH WW, with progressive cognitive decline. \par Physically fine, no focal neuro signs, no parkinsonism, just slight reduced swing in RUE. \par Slower gait, and seems to be deconditioned in LE, L>R knees pain.\par Calmer with Olanzapine, and more tired these days, possibly s/p COVID infection in 11/2021.\par More tired lately, recently dc BB, will follow possible improvement. \par \par Impression:\par -LOAD\par -anxiety/depression/apathy\par -agitation \par \par Plan:\par -give olanzapine only once daily in the late pm\par -repeat EKG with Cardio in 2 weeks, then review and increase Fluoxetine to 20mg if possible.\par \par A thorough discussion was entertained with the patient/caregiver regarding the use of psychoactive medications, their possible benefits and AE profile, including the risk of cardiovascular complications.\par We discussed the benefits of being active, physically and mentally, and the need to to establish a routine in this respect.\par Driving abilities and firearms possession and use were discussed, in relation to progression of the cognitive decline, and the need to assess them periodically.\par Patient/caregiver understand and agree with the plan.\par

## 2021-12-21 NOTE — HISTORY OF PRESENT ILLNESS
[FreeTextEntry1] : COVID VACCINE FULL.\par COVID in 11/2021, no symptoms. \par \par \par \par HPI-Interval Hx 20211221:\par After COVID in 11/2021, with no symptoms, she has been more apathetic. She needs to be instructed on everything now. \par Spends days sitting.\par Appetite ok.\par Sleep ok, sleeps in late and at ties dozes off in the day.\par \par Amiodarone was DC due elevated QTc (600ms).\par BB also stopped due to low HR.\par Will have to recheck EKG and then increase Fluoxetine to 20mg. \par \par \par HPI-Interval Hx 20210921:\par She was admitted to hospital for tachycardia (176) on 5/20, started on Amiodarone and loop recorder put in. pt was asymptomatic. Possible AFIB with HVR.Ok to reduce olanzapine to 1tab QHS only since pt was too sleepy.\par \par Since last seen, more decline, lost some ADL, e.g. brushing teeth. Now she needs to be followed tightly and coached.\par She tends to get nervous at night and cry.\par More repetitive now.\par Appetite a bit less, but weight is stable.\par Sleep stable, though in the last few days she is getting up a lot to check the doors. \par \par Rest is stable.\par \par HPI-Interval Hx [id number]:\par pt has been more irritable lately, more anxious, tends to cry.\par Sleep and appetite ok.\par Gait stable.\par Rest is stable.\par \par HPI-Interval Hx 20210209:\par pt has been stable, with some gradual loss of STM, but rest is stable.\par ADL still ok.\par Less agitation, not picking on hair anymore.\par Sleeps and eats well.\par Motor: per daughter, somewhat slower, but no falls.\par \par HPI-Interval Hx 20201110:\par Pt has changed BP meds, now appears to be only on Metoprolol 25m, daughter to confirm.\par Memory worsening. \par Still restless and picking at her sking, though she is busy during the day. \par In the past she has not tolerated SSRI, which Seroquel did not work.\par Nuedexta no effect. \par Appetite ok, sleeps well, but a lot. \par \par HPI-Interval Hx 20200707:\par pt has been stable, but for the last 2 months family has noted more anxiety and fidgeting with objects and nails. No wounds.\par Sleep: intact. \par Appetite: ok.\par Recent ankle sprain and severe cellulitis, getting better.\par ADL still ok.\par \par HPI-Interval Hx 20200107:\par pt here for follow-up. Overall stable, but per family her STM is worse. Speech and ADL are still fine.\par IADL limited, but she can use microwave, phone. \par Limited activities, some apathy is present.\par Goes to day center 2/week. \par Aricept 15mg, well tolerated.\par Appetite and sleep are fine.\par \par HPI-Interval Hx 20190805:\par Pt overall stable, but apathy is important.\par Per daughter, she goes with her to do activities and exercise, but when she is at home she would sleep all the time.\par Lexapro at 5mg, reduced due to sleepiness, though not the cause.\par Appetite is fine. \par \par HPI-Interval Hx [id number]:\par since starting Namnzaric, per daughter, she is more unsteady, more unsure, more confused. Her PCP dc Lexapro due to weight gain. She also appears more depressed. \par Going to senior center 4 days a week, but not much exercise. \par Appetite is fine. Sleep is maintained. \par per family, gait is a bit slower, but no falls. \par \par HPI-Interval Hx 20181008:\par After the recent passing of her , she has had a set back, of mood and memory. She had lost weight, now getting it back.\par Daughter is very distraught, as she also lost her  1y ago, and s trying to get better.\par Better with Lexapro and Zyprexa.\par \par HPI-Interval Hx 20180213:\par Family unable to get MRI, will call directly. \par Ok on meds, no AE, but has sundowning confusion and gets anxious.\par Physically ok, sleep and appetite are fine. \par \par PMH:\par 89yo RH WW, with HTN, HLD, depression, here for evaluation of memory issues. 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 Started Namzaric, now at max dose. \par \par ADL: ok.\par IADL: limited. can cook and wash house. Does Chores. \par Sleep: ok.\par Appetite: intact, but has lost several lbs in the last few months. \par \par Personality: she might react badly to criticism. \par \par She has been very independent and active until very recently.

## 2021-12-21 NOTE — DATA REVIEWED
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

## 2021-12-21 NOTE — PHYSICAL EXAM
[General Appearance - Alert] : alert [General Appearance - In No Acute Distress] : in no acute distress [Impaired Insight] : insight and judgment were intact [Affect] : the affect was normal [Person] : oriented to person [Remote Intact] : remote memory intact [Registration Intact] : recent registration memory intact [Concentration Intact] : normal concentrating ability [Visual Intact] : visual attention was ~T not ~L decreased [Naming Objects] : no difficulty naming common objects [Fluency] : fluency intact [Comprehension] : comprehension intact [Reading] : reading intact [Past History] : adequate knowledge of personal past history [Vocabulary] : adequate range of vocabulary [Total Score ___ / 30] : the patient achieved a score of [unfilled] /30 [Date / Time ___ / 5] : date / time [unfilled] / 5 [Place ___ / 5] : place [unfilled] / 5 [Registration ___ / 3] : registration [unfilled] / 3 [Serial Sevens ___/5] : serial sevens [unfilled] / 5 [Naming 2 Objects ___ / 2] : naming two objects [unfilled] / 2 [Repeating a Sentence ___ / 1] : repeating a sentence [unfilled] / 1 [Writing a Sentence ___ / 1] : write sentence [unfilled] / 1 [3-stage Verbal Command ___ / 3] : three-stage verbal command [unfilled] / 3 [Written Command ___ / 1] : written command [unfilled] / 1 [Copy a Design ___ / 1] : copy a design [unfilled] / 1 [Recall ___ / 3] : recall [unfilled] / 3 [Cranial Nerves Optic (II)] : visual acuity intact bilaterally,  visual fields full to confrontation, pupils equal round and reactive to light [Cranial Nerves Oculomotor (III)] : extraocular motion intact [Cranial Nerves Trigeminal (V)] : facial sensation intact symmetrically [Cranial Nerves Facial (VII)] : face symmetrical [Cranial Nerves Vestibulocochlear (VIII)] : hearing was intact bilaterally [Cranial Nerves Glossopharyngeal (IX)] : tongue and palate midline [Cranial Nerves Accessory (XI - Cranial And Spinal)] : head turning and shoulder shrug symmetric [Cranial Nerves Hypoglossal (XII)] : there was no tongue deviation with protrusion [Motor Strength] : muscle strength was normal in all four extremities [Involuntary Movements] : no involuntary movements were seen [No Muscle Atrophy] : normal bulk in all four extremities [Motor Handedness Right-Handed] : the patient is right hand dominant [Sensation Tactile Decrease] : light touch was intact [Sensation Pain / Temperature Decrease] : pain and temperature was intact [Sensation Vibration Decrease] : vibration was intact [Proprioception] : proprioception was intact [Abnormal Walk] : normal gait [Balance] : balance was intact [2+] : Brachioradialis left 2+ [1+] : Ankle jerk left 1+ [Sclera] : the sclera and conjunctiva were normal [PERRL With Normal Accommodation] : pupils were equal in size, round, reactive to light, with normal accommodation [Extraocular Movements] : extraocular movements were intact [Optic Disc Abnormality] : the optic disc were normal in size and color [No APD] : no afferent pupillary defect [No JOSE] : no internuclear ophthalmoplegia [Full Visual Field] : full visual field [Outer Ear] : the ears and nose were normal in appearance [Oropharynx] : the oropharynx was normal [Neck Appearance] : the appearance of the neck was normal [Neck Cervical Mass (___cm)] : no neck mass was observed [Jugular Venous Distention Increased] : there was no jugular-venous distention [Thyroid Diffuse Enlargement] : the thyroid was not enlarged [Thyroid Nodule] : there were no palpable thyroid nodules [Auscultation Breath Sounds / Voice Sounds] : lungs were clear to auscultation bilaterally [Heart Rate And Rhythm] : heart rate was normal and rhythm regular [Heart Sounds] : normal S1 and S2 [Heart Sounds Gallop] : no gallops [Murmurs] : no murmurs [Heart Sounds Pericardial Friction Rub] : no pericardial rub [Arterial Pulses Carotid] : carotid pulses were normal with no bruits [Full Pulse] : the pedal pulses are present [Edema] : there was no peripheral edema [Bowel Sounds] : normal bowel sounds [Abdomen Soft] : soft [Abdomen Tenderness] : non-tender [Abdomen Mass (___ Cm)] : no abdominal mass palpated [No CVA Tenderness] : no ~M costovertebral angle tenderness [No Spinal Tenderness] : no spinal tenderness [Nail Clubbing] : no clubbing  or cyanosis of the fingernails [Musculoskeletal - Swelling] : no joint swelling seen [Motor Tone] : muscle strength and tone were normal [Skin Color & Pigmentation] : normal skin color and pigmentation [Skin Turgor] : normal skin turgor [] : no rash [Place] : disoriented to place [Time] : disoriented to time [Short Term Intact] : short term memory impaired [Span Intact] : the attention span was decreased [Repeating Phrases] : difficulty repeating a phrase [Writing A Sentence] : difficulty writing a sentence [Current Events] : inadequate knowledge of current events [Romberg's Sign] : Romberg's sign was negtive [Allodynia] : no ~T allodynia present [Dysesthesia] : no dysesthesia [Hyperesthesia] : no hyperesthesia [Limited Balance] : balance was intact [Past-pointing] : there was no past-pointing [Tremor] : no tremor present [Dysdiadochokinesia Bilaterally] : not present [Coordination - Dysmetria Impaired Finger-to-Nose Bilateral] : not present [Coordination - Dysmetria Impaired Heel-to-Shin Bilateral] : not present [Plantar Reflex Right Only] : normal on the right [Plantar Reflex Left Only] : normal on the left [FreeTextEntry4] : Presidents: 0/5.\par Alternating Pattern: ok\par Clock: 1/3, can read clocks\par Luria: ok, forgets pattern\par Spiral: ok\par Knows R/L on self and examiner.\par X-line commands: ok\par Praxia: ok. [FreeTextEntry6] : Mildly reduced swing in RUE [FreeTextEntry8] : a bit slower and tends to hold hands for safety. [FreeTextEntry1] : deconditioned, seems to have lower energy and strength in knees.

## 2022-01-05 ENCOUNTER — NON-APPOINTMENT (OUTPATIENT)
Age: 87
End: 2022-01-05

## 2022-01-11 ENCOUNTER — NON-APPOINTMENT (OUTPATIENT)
Age: 87
End: 2022-01-11

## 2022-02-14 ENCOUNTER — NON-APPOINTMENT (OUTPATIENT)
Age: 87
End: 2022-02-14

## 2022-03-29 ENCOUNTER — APPOINTMENT (OUTPATIENT)
Dept: NEUROLOGY | Facility: CLINIC | Age: 87
End: 2022-03-29
Payer: MEDICARE

## 2022-03-29 VITALS
WEIGHT: 123 LBS | HEART RATE: 91 BPM | DIASTOLIC BLOOD PRESSURE: 78 MMHG | HEIGHT: 59 IN | SYSTOLIC BLOOD PRESSURE: 151 MMHG | BODY MASS INDEX: 24.8 KG/M2

## 2022-03-29 DIAGNOSIS — R45.1 RESTLESSNESS AND AGITATION: ICD-10-CM

## 2022-03-29 PROCEDURE — 99214 OFFICE O/P EST MOD 30 MIN: CPT

## 2022-03-29 NOTE — HISTORY OF PRESENT ILLNESS
[FreeTextEntry1] : COVID VACCINE FULL.\par COVID in 11/2021, no symptoms. \par \par HPI-Interval Hx 20220329:\par Since last seen, she has been stable, has developed a constant moaning. \par Sleeps a lot, 10-12h/day and mostly sedentary.\par Appetite ok.\par No agitation, mostly calm.\par Resent loss of independence on ADL, needs to be guided and reminded. \par \par HPI-Interval Hx [id number]:\par After COVID in 11/2021, with no symptoms, she has been more apathetic. She needs to be instructed on everything now. \par Spends days sitting.\par Appetite ok.\par Sleep ok, sleeps in late and at ties dozes off in the day.\par \par Amiodarone was DC due elevated QTc (600ms).\par BB also stopped due to low HR.\par Will have to recheck EKG and then increase Fluoxetine to 20mg. \par \par \par HPI-Interval Hx [id number]:\par She was admitted to hospital for tachycardia (176) on 5/20, started on Amiodarone and loop recorder put in. pt was asymptomatic. Possible AFIB with HVR.Ok to reduce olanzapine to 1tab QHS only since pt was too sleepy.\par \par Since last seen, more decline, lost some ADL, e.g. brushing teeth. Now she needs to be followed tightly and coached.\par She tends to get nervous at night and cry.\par More repetitive now.\par Appetite a bit less, but weight is stable.\par Sleep stable, though in the last few days she is getting up a lot to check the doors. \par \par Rest is stable.\par \par HPI-Interval Hx [id number]:\par pt has been more irritable lately, more anxious, tends to cry.\par Sleep and appetite ok.\par Gait stable.\par Rest is stable.\par \par HPI-Interval Hx [id number]:\par pt has been stable, with some gradual loss of STM, but rest is stable.\par ADL still ok.\par Less agitation, not picking on hair anymore.\par Sleeps and eats well.\par Motor: per daughter, somewhat slower, but no falls.\par \par HPI-Interval Hx [id number]:\par Pt has changed BP meds, now appears to be only on Metoprolol 25m, daughter to confirm.\par Memory worsening. \par Still restless and picking at her sking, though she is busy during the day. \par In the past she has not tolerated SSRI, which Seroquel did not work.\par Nuedexta no effect. \par Appetite ok, sleeps well, but a lot. \par \par HPI-Interval Hx [id number]:\par pt has been stable, but for the last 2 months family has noted more anxiety and fidgeting with objects and nails. No wounds.\par Sleep: intact. \par Appetite: ok.\par Recent ankle sprain and severe cellulitis, getting better.\par ADL still ok.\par \par HPI-Interval Hx [id number]:\par pt here for follow-up. Overall stable, but per family her STM is worse. Speech and ADL are still fine.\par IADL limited, but she can use microwave, phone. \par Limited activities, some apathy is present.\par Goes to day center 2/week. \par Aricept 15mg, well tolerated.\par Appetite and sleep are fine.\par \par HPI-Interval Hx [id number]:\par Pt overall stable, but apathy is important.\par Per daughter, she goes with her to do activities and exercise, but when she is at home she would sleep all the time.\par Lexapro at 5mg, reduced due to sleepiness, though not the cause.\par Appetite is fine. \par \par HPI-Interval Hx [id number]:\par since starting Namnzaric, per daughter, she is more unsteady, more unsure, more confused. Her PCP dc Lexapro due to weight gain. She also appears more depressed. \par Going to senior center 4 days a week, but not much exercise. \par Appetite is fine. Sleep is maintained. \par per family, gait is a bit slower, but no falls. \par \par HPI-Interval Hx [id number]:\par After the recent passing of her , she has had a set back, of mood and memory. She had lost weight, now getting it back.\par Daughter is very distraught, as she also lost her  1y ago, and s trying to get better.\par Better with Lexapro and Zyprexa.\par \par HPI-Interval Hx [id number]:\par Family unable to get MRI, will call directly. \par Ok on meds, no AE, but has sundowning confusion and gets anxious.\par Physically ok, sleep and appetite are fine. \par \par PMH:\par 89yo RH WW, with HTN, HLD, depression, here for evaluation of memory issues. 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 Started Namzaric, now at max dose. \par \par ADL: ok.\par IADL: limited. can cook and wash house. Does Chores. \par Sleep: ok.\par Appetite: intact, but has lost several lbs in the last few months. \par \par Personality: she might react badly to criticism. \par \par She has been very independent and active until very recently.

## 2022-03-29 NOTE — ASSESSMENT
[FreeTextEntry1] : Assessment:\par 89yo RH WW, with progressive cognitive decline. \par Physically fine, no focal neuro signs, no parkinsonism, just slight reduced swing in RUE. \par Deconditioned, mildly.\par Calmer with Olanzapine.\par Tends to oversleep, likely out of boredom.\par \par Impression:\par -LOAD\par -anxiety/depression/apathy\par -agitation \par \par Plan:\par -increase Namenda to 14mg XR\par -recommend to be more active outside as possible.\par \par A thorough discussion was entertained with the patient/caregiver regarding the use of psychoactive medications, their possible benefits and AE profile, including the risk of cardiovascular complications.\par We discussed the benefits of being active, physically and mentally, and the need to to establish a routine in this respect.\par Driving abilities and firearms possession and use were discussed, in relation to progression of the cognitive decline, and the need to assess them periodically.\par Patient/caregiver understand and agree with the plan.\par

## 2022-03-29 NOTE — DATA REVIEWED
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

## 2022-04-27 ENCOUNTER — RX RENEWAL (OUTPATIENT)
Age: 87
End: 2022-04-27

## 2022-05-23 ENCOUNTER — RX RENEWAL (OUTPATIENT)
Age: 87
End: 2022-05-23

## 2022-07-29 ENCOUNTER — NON-APPOINTMENT (OUTPATIENT)
Age: 87
End: 2022-07-29

## 2022-08-09 ENCOUNTER — APPOINTMENT (OUTPATIENT)
Dept: NEUROLOGY | Facility: CLINIC | Age: 87
End: 2022-08-09

## 2022-08-09 VITALS — HEIGHT: 59 IN | HEART RATE: 85 BPM | SYSTOLIC BLOOD PRESSURE: 148 MMHG | DIASTOLIC BLOOD PRESSURE: 72 MMHG

## 2022-08-09 DIAGNOSIS — R53.81 OTHER MALAISE: ICD-10-CM

## 2022-08-09 PROCEDURE — 99214 OFFICE O/P EST MOD 30 MIN: CPT

## 2022-08-09 RX ORDER — ATORVASTATIN CALCIUM 20 MG/1
20 TABLET, FILM COATED ORAL
Qty: 90 | Refills: 0 | Status: COMPLETED | COMMUNITY
Start: 2022-06-24

## 2022-08-09 RX ORDER — ATORVASTATIN CALCIUM 20 MG/1
20 TABLET, FILM COATED ORAL
Refills: 0 | Status: ACTIVE | COMMUNITY
Start: 2017-11-20

## 2022-08-09 RX ORDER — NITROFURANTOIN (MONOHYDRATE/MACROCRYSTALS) 25; 75 MG/1; MG/1
100 CAPSULE ORAL
Qty: 14 | Refills: 0 | Status: COMPLETED | COMMUNITY
Start: 2022-03-24

## 2022-08-09 RX ORDER — FLUOXETINE HYDROCHLORIDE 10 MG/1
10 CAPSULE ORAL
Qty: 90 | Refills: 0 | Status: COMPLETED | COMMUNITY
Start: 2022-02-08

## 2022-08-09 NOTE — DATA REVIEWED
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

## 2022-08-09 NOTE — PHYSICAL EXAM
[General Appearance - Alert] : alert [General Appearance - In No Acute Distress] : in no acute distress [Impaired Insight] : insight and judgment were intact [Affect] : the affect was normal [Person] : oriented to person [Remote Intact] : remote memory intact [Registration Intact] : recent registration memory intact [Concentration Intact] : normal concentrating ability [Visual Intact] : visual attention was ~T not ~L decreased [Naming Objects] : no difficulty naming common objects [Fluency] : fluency intact [Comprehension] : comprehension intact [Reading] : reading intact [Past History] : adequate knowledge of personal past history [Vocabulary] : adequate range of vocabulary [Total Score ___ / 30] : the patient achieved a score of [unfilled] /30 [Date / Time ___ / 5] : date / time [unfilled] / 5 [Place ___ / 5] : place [unfilled] / 5 [Registration ___ / 3] : registration [unfilled] / 3 [Serial Sevens ___/5] : serial sevens [unfilled] / 5 [Naming 2 Objects ___ / 2] : naming two objects [unfilled] / 2 [Repeating a Sentence ___ / 1] : repeating a sentence [unfilled] / 1 [Writing a Sentence ___ / 1] : write sentence [unfilled] / 1 [3-stage Verbal Command ___ / 3] : three-stage verbal command [unfilled] / 3 [Written Command ___ / 1] : written command [unfilled] / 1 [Copy a Design ___ / 1] : copy a design [unfilled] / 1 [Recall ___ / 3] : recall [unfilled] / 3 [Cranial Nerves Optic (II)] : visual acuity intact bilaterally,  visual fields full to confrontation, pupils equal round and reactive to light [Cranial Nerves Oculomotor (III)] : extraocular motion intact [Cranial Nerves Trigeminal (V)] : facial sensation intact symmetrically [Cranial Nerves Facial (VII)] : face symmetrical [Cranial Nerves Vestibulocochlear (VIII)] : hearing was intact bilaterally [Cranial Nerves Glossopharyngeal (IX)] : tongue and palate midline [Cranial Nerves Accessory (XI - Cranial And Spinal)] : head turning and shoulder shrug symmetric [Cranial Nerves Hypoglossal (XII)] : there was no tongue deviation with protrusion [Motor Strength] : muscle strength was normal in all four extremities [Involuntary Movements] : no involuntary movements were seen [No Muscle Atrophy] : normal bulk in all four extremities [Motor Handedness Right-Handed] : the patient is right hand dominant [Sensation Tactile Decrease] : light touch was intact [Sensation Pain / Temperature Decrease] : pain and temperature was intact [Sensation Vibration Decrease] : vibration was intact [Proprioception] : proprioception was intact [Abnormal Walk] : normal gait [Balance] : balance was intact [2+] : Brachioradialis left 2+ [1+] : Ankle jerk left 1+ [Sclera] : the sclera and conjunctiva were normal [PERRL With Normal Accommodation] : pupils were equal in size, round, reactive to light, with normal accommodation [Extraocular Movements] : extraocular movements were intact [Optic Disc Abnormality] : the optic disc were normal in size and color [No APD] : no afferent pupillary defect [No JOSE] : no internuclear ophthalmoplegia [Full Visual Field] : full visual field [Outer Ear] : the ears and nose were normal in appearance [Oropharynx] : the oropharynx was normal [Neck Appearance] : the appearance of the neck was normal [Neck Cervical Mass (___cm)] : no neck mass was observed [Jugular Venous Distention Increased] : there was no jugular-venous distention [Thyroid Diffuse Enlargement] : the thyroid was not enlarged [Thyroid Nodule] : there were no palpable thyroid nodules [Auscultation Breath Sounds / Voice Sounds] : lungs were clear to auscultation bilaterally [Heart Rate And Rhythm] : heart rate was normal and rhythm regular [Heart Sounds] : normal S1 and S2 [Heart Sounds Gallop] : no gallops [Murmurs] : no murmurs [Heart Sounds Pericardial Friction Rub] : no pericardial rub [Arterial Pulses Carotid] : carotid pulses were normal with no bruits [Full Pulse] : the pedal pulses are present [Edema] : there was no peripheral edema [Bowel Sounds] : normal bowel sounds [Abdomen Soft] : soft [Abdomen Tenderness] : non-tender [Abdomen Mass (___ Cm)] : no abdominal mass palpated [No CVA Tenderness] : no ~M costovertebral angle tenderness [No Spinal Tenderness] : no spinal tenderness [Nail Clubbing] : no clubbing  or cyanosis of the fingernails [Musculoskeletal - Swelling] : no joint swelling seen [Motor Tone] : muscle strength and tone were normal [Skin Color & Pigmentation] : normal skin color and pigmentation [Skin Turgor] : normal skin turgor [] : no rash [Place] : disoriented to place [Time] : disoriented to time [Short Term Intact] : short term memory impaired [Span Intact] : the attention span was decreased [Repeating Phrases] : difficulty repeating a phrase [Writing A Sentence] : difficulty writing a sentence [Current Events] : inadequate knowledge of current events [Motor Strength Upper Extremities Bilaterally] : strength was normal in both upper extremities [Motor Strength Lower Extremities Bilaterally] : strength was normal in both lower extremities [Romberg's Sign] : Romberg's sign was negtive [Allodynia] : no ~T allodynia present [Dysesthesia] : no dysesthesia [Hyperesthesia] : no hyperesthesia [Limited Balance] : balance was intact [Past-pointing] : there was no past-pointing [Tremor] : no tremor present [Dysdiadochokinesia Bilaterally] : not present [Coordination - Dysmetria Impaired Finger-to-Nose Bilateral] : not present [Coordination - Dysmetria Impaired Heel-to-Shin Bilateral] : not present [Plantar Reflex Right Only] : normal on the right [Plantar Reflex Left Only] : normal on the left [FreeTextEntry4] : Presidents: 0/5.\par Exam limited. Responds to simple questions and follows commands. Speech limited to basic communications. Praxis grossly intact.  [FreeTextEntry6] : Mildly increased tone in L>R UE. [FreeTextEntry8] : Slower gait, no shuffle, tends to reach for support, but can walk fine alone. March ok. [FreeTextEntry1] : deconditioned, seems to have lower energy and strength.

## 2022-08-09 NOTE — ASSESSMENT
[FreeTextEntry1] : Assessment:\par 89yo RH WW, with progressive cognitive decline. \par Physically fine, no focal neuro signs, no parkinsonism, just slight reduced swing in RUE. \par Deconditioned, mildly.\par Calmer with Olanzapine.\par Tends to oversleep, likely out of boredom.\par \par Impression:\par -LOAD\par -anxiety/depression/apathy\par -agitation \par \par Plan:\par -Namenda 7mg, stay at same dose\par -dc Olanzapine\par -call me back in 2 weeks, consider stimulant\par -recommend to be more active outside as possible.\par \par A thorough discussion was entertained with the patient/caregiver regarding the use of psychoactive medications, their possible benefits and AE profile, including the risk of cardiovascular complications.\par We discussed the benefits of being active, physically and mentally, and the need to to establish a routine in this respect.\par Driving abilities and firearms possession and use were discussed, in relation to progression of the cognitive decline, and the need to assess them periodically.\par Patient/caregiver understand and agree with the plan.\par

## 2022-08-09 NOTE — HISTORY OF PRESENT ILLNESS
[FreeTextEntry1] : COVID VACCINE FULL.\par COVID in 11/2021, no symptoms. \par \par \par HPI-Interval Hx 20220809:\par pt here for follow-up.\par Lately, there has been worsening. \par Less appetite, though no real loss of weight. She tends to be bloated. No meds changes recently. \par She is less communicative, tends to moan along the day intermittently.\par More disoriented, even at home, espinoza time to find bathroom etc. \par More apraxia in daily tasks.\par Gait: requires more attention, tends to be more unstable, holds on to people and walls; she can go to bathroom at night by herself. Mostly related to motivation. No falls.  \par Recent PCP eval, all ok. \par No agitation. \par Lack of motivation to move and go out.\par \par HPI-Interval Hx 20220329:\par Since last seen, she has been stable, has developed a constant moaning. \par Sleeps a lot, 10-12h/day and mostly sedentary.\par Appetite ok.\par No agitation, mostly calm.\par Resent loss of independence on ADL, needs to be guided and reminded. \par \par HPI-Interval Hx 20211221:\par After COVID in 11/2021, with no symptoms, she has been more apathetic. She needs to be instructed on everything now. \par Spends days sitting.\par Appetite ok.\par Sleep ok, sleeps in late and at ties dozes off in the day.\par \par Amiodarone was DC due elevated QTc (600ms).\par BB also stopped due to low HR.\par Will have to recheck EKG and then increase Fluoxetine to 20mg. \par \par \par HPI-Interval Hx 20210921:\par She was admitted to hospital for tachycardia (176) on 5/20, started on Amiodarone and loop recorder put in. pt was asymptomatic. Possible AFIB with HVR.Ok to reduce olanzapine to 1tab QHS only since pt was too sleepy.\par \par Since last seen, more decline, lost some ADL, e.g. brushing teeth. Now she needs to be followed tightly and coached.\par She tends to get nervous at night and cry.\par More repetitive now.\par Appetite a bit less, but weight is stable.\par Sleep stable, though in the last few days she is getting up a lot to check the doors. \par \par Rest is stable.\par \par HPI-Interval Hx 20210518:\par pt has been more irritable lately, more anxious, tends to cry.\par Sleep and appetite ok.\par Gait stable.\par Rest is stable.\par \par HPI-Interval Hx 20210209:\par pt has been stable, with some gradual loss of STM, but rest is stable.\par ADL still ok.\par Less agitation, not picking on hair anymore.\par Sleeps and eats well.\par Motor: per daughter, somewhat slower, but no falls.\par \par HPI-Interval Hx 20201110:\par Pt has changed BP meds, now appears to be only on Metoprolol 25m, daughter to confirm.\par Memory worsening. \par Still restless and picking at her sking, though she is busy during the day. \par In the past she has not tolerated SSRI, which Seroquel did not work.\par Nuedexta no effect. \par Appetite ok, sleeps well, but a lot. \par \par HPI-Interval Hx 20200707:\par pt has been stable, but for the last 2 months family has noted more anxiety and fidgeting with objects and nails. No wounds.\par Sleep: intact. \par Appetite: ok.\par Recent ankle sprain and severe cellulitis, getting better.\par ADL still ok.\par \par HPI-Interval Hx 20200107:\par pt here for follow-up. Overall stable, but per family her STM is worse. Speech and ADL are still fine.\par IADL limited, but she can use microwave, phone. \par Limited activities, some apathy is present.\par Goes to day center 2/week. \par Aricept 15mg, well tolerated.\par Appetite and sleep are fine.\par \par HPI-Interval Hx 20190805:\par Pt overall stable, but apathy is important.\par Per daughter, she goes with her to do activities and exercise, but when she is at home she would sleep all the time.\par Lexapro at 5mg, reduced due to sleepiness, though not the cause.\par Appetite is fine. \par \par HPI-Interval Hx [id number]:\par since starting Namnzaric, per daughter, she is more unsteady, more unsure, more confused. Her PCP dc Lexapro due to weight gain. She also appears more depressed. \par Going to senior center 4 days a week, but not much exercise. \par Appetite is fine. Sleep is maintained. \par per family, gait is a bit slower, but no falls. \par \par HPI-Interval Hx 20181008:\par After the recent passing of her , she has had a set back, of mood and memory. She had lost weight, now getting it back.\par Daughter is very distraught, as she also lost her  1y ago, and s trying to get better.\par Better with Lexapro and Zyprexa.\par \par HPI-Interval Hx [id number]:\par Family unable to get MRI, will call directly. \par Ok on meds, no AE, but has sundowning confusion and gets anxious.\par Physically ok, sleep and appetite are fine. \par \par PMH:\par 87yo RH WW, with HTN, HLD, depression, here for evaluation of memory issues. 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 Started Namzaric, now at max dose. \par \par ADL: ok.\par IADL: limited. can cook and wash house. Does Chores. \par Sleep: ok.\par Appetite: intact, but has lost several lbs in the last few months. \par \par Personality: she might react badly to criticism. \par \par She has been very independent and active until very recently.

## 2022-08-16 ENCOUNTER — RX RENEWAL (OUTPATIENT)
Age: 87
End: 2022-08-16

## 2022-08-31 RX ORDER — METHYLPHENIDATE HYDROCHLORIDE 5 MG/1
5 TABLET ORAL DAILY
Qty: 30 | Refills: 0 | Status: DISCONTINUED | COMMUNITY
Start: 2022-08-22 | End: 2022-08-31

## 2022-09-01 ENCOUNTER — NON-APPOINTMENT (OUTPATIENT)
Age: 87
End: 2022-09-01

## 2022-10-04 ENCOUNTER — APPOINTMENT (OUTPATIENT)
Dept: NEUROLOGY | Facility: CLINIC | Age: 87
End: 2022-10-04

## 2022-10-04 VITALS — SYSTOLIC BLOOD PRESSURE: 152 MMHG | DIASTOLIC BLOOD PRESSURE: 76 MMHG | HEART RATE: 77 BPM

## 2022-10-04 DIAGNOSIS — F41.9 ANXIETY DISORDER, UNSPECIFIED: ICD-10-CM

## 2022-10-04 PROCEDURE — 99214 OFFICE O/P EST MOD 30 MIN: CPT

## 2022-10-04 NOTE — ASSESSMENT
[FreeTextEntry1] : Assessment:\par 89yo RH WW, with LOAD.\par No major behavioral issues, but daughter resenting now caregiver responsibilities, and seeing her mother like this.\par Will seek help in the near future.\par \par Impression:\par -LOAD\par -anxiety/depression/apathy\par -agitation \par \par Plan:\par -no change in meds\par -when they are back from [place], will talk about getting a HHA and support group for daughter.\par \par A thorough discussion was entertained with the patient/caregiver regarding the use of psychoactive medications, their possible benefits and AE profile, including the risk of cardiovascular complications.\par We discussed the benefits of being active, physically and mentally, and the need to to establish a routine in this respect.\par Driving abilities and firearms possession and use were discussed, in relation to progression of the cognitive decline, and the need to assess them periodically.\par Patient/caregiver understand and agree with the plan.\par

## 2022-10-04 NOTE — HISTORY OF PRESENT ILLNESS
[FreeTextEntry1] : COVID VACCINE FULL.\par COVID in 11/2021, no symptoms. \par \par \par HPI-Interval Hx 20221004:\par Pt here with daughter and her friend.\par She did not tolerate Ritalin, increase of BP.\par Back on Olanzapine 2.5mg.\par Appetite and sleep are ok.\par Overall stable, though quite sedentary and apathetic. Still moaning. \par \par HPI-Interval Hx 20220809:\par pt here for follow-up.\par Lately, there has been worsening. \par Less appetite, though no real loss of weight. She tends to be bloated. No meds changes recently. \par She is less communicative, tends to moan along the day intermittently.\par More disoriented, even at home, espinoza time to find bathroom etc. \par More apraxia in daily tasks.\par Gait: requires more attention, tends to be more unstable, holds on to people and walls; she can go to bathroom at night by herself. Mostly related to motivation. No falls.  \par Recent PCP eval, all ok. \par No agitation. \par Lack of motivation to move and go out.\par \par HPI-Interval Hx 20220329:\par Since last seen, she has been stable, has developed a constant moaning. \par Sleeps a lot, 10-12h/day and mostly sedentary.\par Appetite ok.\par No agitation, mostly calm.\par Resent loss of independence on ADL, needs to be guided and reminded. \par \par HPI-Interval Hx 20211221:\par After COVID in 11/2021, with no symptoms, she has been more apathetic. She needs to be instructed on everything now. \par Spends days sitting.\par Appetite ok.\par Sleep ok, sleeps in late and at ties dozes off in the day.\par \par Amiodarone was DC due elevated QTc (600ms).\par BB also stopped due to low HR.\par Will have to recheck EKG and then increase Fluoxetine to 20mg. \par \par \par HPI-Interval Hx 20210921:\par She was admitted to hospital for tachycardia (176) on 5/20, started on Amiodarone and loop recorder put in. pt was asymptomatic. Possible AFIB with HVR.Ok to reduce olanzapine to 1tab QHS only since pt was too sleepy.\par \par Since last seen, more decline, lost some ADL, e.g. brushing teeth. Now she needs to be followed tightly and coached.\par She tends to get nervous at night and cry.\par More repetitive now.\par Appetite a bit less, but weight is stable.\par Sleep stable, though in the last few days she is getting up a lot to check the doors. \par \par Rest is stable.\par \par HPI-Interval Hx 20210518:\par pt has been more irritable lately, more anxious, tends to cry.\par Sleep and appetite ok.\par Gait stable.\par Rest is stable.\par \par HPI-Interval Hx 20210209:\par pt has been stable, with some gradual loss of STM, but rest is stable.\par ADL still ok.\par Less agitation, not picking on hair anymore.\par Sleeps and eats well.\par Motor: per daughter, somewhat slower, but no falls.\par \par HPI-Interval Hx 20201110:\par Pt has changed BP meds, now appears to be only on Metoprolol 25m, daughter to confirm.\par Memory worsening. \par Still restless and picking at her sking, though she is busy during the day. \par In the past she has not tolerated SSRI, which Seroquel did not work.\par Nuedexta no effect. \par Appetite ok, sleeps well, but a lot. \par \par HPI-Interval Hx 20200707:\par pt has been stable, but for the last 2 months family has noted more anxiety and fidgeting with objects and nails. No wounds.\par Sleep: intact. \par Appetite: ok.\par Recent ankle sprain and severe cellulitis, getting better.\par ADL still ok.\par \par HPI-Interval Hx 20200107:\par pt here for follow-up. Overall stable, but per family her STM is worse. Speech and ADL are still fine.\par IADL limited, but she can use microwave, phone. \par Limited activities, some apathy is present.\par Goes to day center 2/week. \par Aricept 15mg, well tolerated.\par Appetite and sleep are fine.\par \par HPI-Interval Hx 20190805:\par Pt overall stable, but apathy is important.\par Per daughter, she goes with her to do activities and exercise, but when she is at home she would sleep all the time.\par Lexapro at 5mg, reduced due to sleepiness, though not the cause.\par Appetite is fine. \par \par HPI-Interval Hx 20190506:\par since starting Namnzaric, per daughter, she is more unsteady, more unsure, more confused. Her PCP dc Lexapro due to weight gain. She also appears more depressed. \par Going to senior center 4 days a week, but not much exercise. \par Appetite is fine. Sleep is maintained. \par per family, gait is a bit slower, but no falls. \par \par HPI-Interval Hx 20181008:\par After the recent passing of her , she has had a set back, of mood and memory. She had lost weight, now getting it back.\par Daughter is very distraught, as she also lost her  1y ago, and s trying to get better.\par Better with Lexapro and Zyprexa.\par \par HPI-Interval Hx 20180213:\par Family unable to get MRI, will call directly. \par Ok on meds, no AE, but has sundowning confusion and gets anxious.\par Physically ok, sleep and appetite are fine. \par \par PMH:\par 87yo RH WW, with HTN, HLD, depression, here for evaluation of memory issues. 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 Started Namzaric, now at max dose. \par \par ADL: ok.\par IADL: limited. can cook and wash house. Does Chores. \par Sleep: ok.\par Appetite: intact, but has lost several lbs in the last few months. \par \par Personality: she might react badly to criticism. \par \par She has been very independent and active until very recently.

## 2022-10-04 NOTE — DATA REVIEWED
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

## 2022-11-15 ENCOUNTER — RX RENEWAL (OUTPATIENT)
Age: 87
End: 2022-11-15

## 2022-12-20 ENCOUNTER — APPOINTMENT (OUTPATIENT)
Dept: NEUROLOGY | Facility: CLINIC | Age: 87
End: 2022-12-20

## 2022-12-20 VITALS
WEIGHT: 123 LBS | DIASTOLIC BLOOD PRESSURE: 70 MMHG | BODY MASS INDEX: 24.8 KG/M2 | SYSTOLIC BLOOD PRESSURE: 138 MMHG | HEIGHT: 59 IN | HEART RATE: 69 BPM

## 2022-12-20 PROCEDURE — 99214 OFFICE O/P EST MOD 30 MIN: CPT

## 2022-12-20 RX ORDER — OLANZAPINE 2.5 MG/1
2.5 TABLET, FILM COATED ORAL
Qty: 90 | Refills: 2 | Status: DISCONTINUED | COMMUNITY
Start: 2020-11-10 | End: 2022-12-20

## 2022-12-22 NOTE — HISTORY OF PRESENT ILLNESS
[FreeTextEntry1] : COVID VACCINE FULL.\par COVID in 11/2021, no symptoms. \par \par \par HPI-Interval Hx 20221220:\par progression, more apathetic, eating less, no weight loss.\par No sleep issues, no agitation.\par Mood is now more stable. \par Physically ok, motor ok, stable.\par \par \par HPI-Interval Hx 20221004:\par Pt here with daughter and her friend.\par She did not tolerate Ritalin, increase of BP.\par Back on Olanzapine 2.5mg.\par Appetite and sleep are ok.\par Overall stable, though quite sedentary and apathetic. Still moaning. \par \par HPI-Interval Hx 20220809:\par pt here for follow-up.\par Lately, there has been worsening. \par Less appetite, though no real loss of weight. She tends to be bloated. No meds changes recently. \par She is less communicative, tends to moan along the day intermittently.\par More disoriented, even at home, espinoza time to find bathroom etc. \par More apraxia in daily tasks.\par Gait: requires more attention, tends to be more unstable, holds on to people and walls; she can go to bathroom at night by herself. Mostly related to motivation. No falls.  \par Recent PCP eval, all ok. \par No agitation. \par Lack of motivation to move and go out.\par \par HPI-Interval Hx 20220329:\par Since last seen, she has been stable, has developed a constant moaning. \par Sleeps a lot, 10-12h/day and mostly sedentary.\par Appetite ok.\par No agitation, mostly calm.\par Resent loss of independence on ADL, needs to be guided and reminded. \par \par HPI-Interval Hx 20211221:\par After COVID in 11/2021, with no symptoms, she has been more apathetic. She needs to be instructed on everything now. \par Spends days sitting.\par Appetite ok.\par Sleep ok, sleeps in late and at ties dozes off in the day.\par \par Amiodarone was DC due elevated QTc (600ms).\par BB also stopped due to low HR.\par Will have to recheck EKG and then increase Fluoxetine to 20mg. \par \par \par HPI-Interval Hx 20210921:\par She was admitted to hospital for tachycardia (176) on 5/20, started on Amiodarone and loop recorder put in. pt was asymptomatic. Possible AFIB with HVR.Ok to reduce olanzapine to 1tab QHS only since pt was too sleepy.\par \par Since last seen, more decline, lost some ADL, e.g. brushing teeth. Now she needs to be followed tightly and coached.\par She tends to get nervous at night and cry.\par More repetitive now.\par Appetite a bit less, but weight is stable.\par Sleep stable, though in the last few days she is getting up a lot to check the doors. \par \par Rest is stable.\par \par HPI-Interval Hx 20210518:\par pt has been more irritable lately, more anxious, tends to cry.\par Sleep and appetite ok.\par Gait stable.\par Rest is stable.\par \par HPI-Interval Hx 20210209:\par pt has been stable, with some gradual loss of STM, but rest is stable.\par ADL still ok.\par Less agitation, not picking on hair anymore.\par Sleeps and eats well.\par Motor: per daughter, somewhat slower, but no falls.\par \par HPI-Interval Hx 20201110:\par Pt has changed BP meds, now appears to be only on Metoprolol 25m, daughter to confirm.\par Memory worsening. \par Still restless and picking at her sking, though she is busy during the day. \par In the past she has not tolerated SSRI, which Seroquel did not work.\par Nuedexta no effect. \par Appetite ok, sleeps well, but a lot. \par \par HPI-Interval Hx 20200707:\par pt has been stable, but for the last 2 months family has noted more anxiety and fidgeting with objects and nails. No wounds.\par Sleep: intact. \par Appetite: ok.\par Recent ankle sprain and severe cellulitis, getting better.\par ADL still ok.\par \par HPI-Interval Hx 20200107:\par pt here for follow-up. Overall stable, but per family her STM is worse. Speech and ADL are still fine.\par IADL limited, but she can use microwave, phone. \par Limited activities, some apathy is present.\par Goes to day center 2/week. \par Aricept 15mg, well tolerated.\par Appetite and sleep are fine.\par \par HPI-Interval Hx 20190805:\par Pt overall stable, but apathy is important.\par Per daughter, she goes with her to do activities and exercise, but when she is at home she would sleep all the time.\par Lexapro at 5mg, reduced due to sleepiness, though not the cause.\par Appetite is fine. \par \par HPI-Interval Hx 20190506:\par since starting Namnzaric, per daughter, she is more unsteady, more unsure, more confused. Her PCP dc Lexapro due to weight gain. She also appears more depressed. \par Going to senior center 4 days a week, but not much exercise. \par Appetite is fine. Sleep is maintained. \par per family, gait is a bit slower, but no falls. \par \par HPI-Interval Hx 20181008:\par After the recent passing of her , she has had a set back, of mood and memory. She had lost weight, now getting it back.\par Daughter is very distraught, as she also lost her  1y ago, and s trying to get better.\par Better with Lexapro and Zyprexa.\par \par HPI-Interval Hx 20180213:\par Family unable to get MRI, will call directly. \par Ok on meds, no AE, but has sundowning confusion and gets anxious.\par Physically ok, sleep and appetite are fine. \par \par PMH:\par 89yo RH WW, with HTN, HLD, depression, here for evaluation of memory issues. 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 Started Namzaric, now at max dose. \par \par ADL: ok.\par IADL: limited. can cook and wash house. Does Chores. \par Sleep: ok.\par Appetite: intact, but has lost several lbs in the last few months. \par \par Personality: she might react badly to criticism. \par \par She has been very independent and active until very recently.

## 2022-12-22 NOTE — ASSESSMENT
[FreeTextEntry1] : Assessment:\par 89yo RH WW, with LOAD.\par No major behavioral issues, but daughter resenting now caregiver responsibilities, and seeing her mother like this.\par Major issues is apathy.\par Will consider Ritalin. \par Will dc Olanzapine. \par \par Impression:\par -LOAD\par -anxiety/depression/apathy\par \par Plan:\par -dc Olanzapine\par -No Ritalin due to high BP. Can consider alternatives.\par \par A thorough discussion was entertained with the patient/caregiver regarding the use of psychoactive medications, their possible benefits and AE profile, including the risk of cardiovascular complications.\par We discussed the benefits of being active, physically and mentally, and the need to to establish a routine in this respect.\par Driving abilities and firearms possession and use were discussed, in relation to progression of the cognitive decline, and the need to assess them periodically.\par Patient/caregiver understand and agree with the plan.\par

## 2022-12-22 NOTE — DATA REVIEWED
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

## 2023-02-07 ENCOUNTER — RX RENEWAL (OUTPATIENT)
Age: 88
End: 2023-02-07

## 2023-03-14 ENCOUNTER — RX RENEWAL (OUTPATIENT)
Age: 88
End: 2023-03-14

## 2023-05-05 ENCOUNTER — RX RENEWAL (OUTPATIENT)
Age: 88
End: 2023-05-05

## 2023-05-23 RX ORDER — MEMANTINE HYDROCHLORIDE 14 MG/1
14 CAPSULE, EXTENDED RELEASE ORAL
Qty: 90 | Refills: 3 | Status: DISCONTINUED | COMMUNITY
Start: 2021-02-09 | End: 2023-05-23

## 2023-09-11 DIAGNOSIS — R45.3 DEMORALIZATION AND APATHY: ICD-10-CM

## 2023-09-11 RX ORDER — MEMANTINE HYDROCHLORIDE 7 MG/1
7 CAPSULE, EXTENDED RELEASE ORAL
Qty: 90 | Refills: 3 | Status: DISCONTINUED | COMMUNITY
Start: 2022-04-07 | End: 2023-09-11

## 2023-09-29 RX ORDER — DEXTROAMPHETAMINE SACCHARATE, AMPHETAMINE ASPARTATE, DEXTROAMPHETAMINE SULFATE AND AMPHETAMINE SULFATE 1.25; 1.25; 1.25; 1.25 MG/1; MG/1; MG/1; MG/1
5 TABLET ORAL DAILY
Qty: 30 | Refills: 0 | Status: DISCONTINUED | COMMUNITY
Start: 2023-09-11 | End: 2023-09-29

## 2023-09-29 RX ORDER — DONEPEZIL HYDROCHLORIDE 10 MG/1
10 TABLET ORAL
Qty: 90 | Refills: 3 | Status: DISCONTINUED | COMMUNITY
Start: 2019-05-06 | End: 2023-09-29

## 2023-10-30 ENCOUNTER — RX RENEWAL (OUTPATIENT)
Age: 88
End: 2023-10-30

## 2023-10-30 RX ORDER — FLUOXETINE HYDROCHLORIDE 20 MG/1
20 CAPSULE ORAL
Qty: 30 | Refills: 3 | Status: ACTIVE | COMMUNITY
Start: 2021-05-18 | End: 1900-01-01

## 2024-01-17 RX ORDER — TELMISARTAN 80 MG/1
80 TABLET ORAL DAILY
Refills: 0 | Status: DISCONTINUED | COMMUNITY
Start: 2020-11-10 | End: 2024-01-17

## 2024-01-17 RX ORDER — AMLODIPINE BESYLATE 5 MG/1
5 TABLET ORAL DAILY
Qty: 90 | Refills: 0 | Status: DISCONTINUED | COMMUNITY
Start: 2021-02-09 | End: 2024-01-17

## 2024-02-27 DIAGNOSIS — F02.80 ALZHEIMER'S DISEASE, UNSPECIFIED: ICD-10-CM

## 2024-02-27 DIAGNOSIS — G30.9 ALZHEIMER'S DISEASE, UNSPECIFIED: ICD-10-CM

## 2025-07-28 ENCOUNTER — APPOINTMENT (OUTPATIENT)
Dept: HOME HEALTH SERVICES | Facility: HOME HEALTH | Age: 89
End: 2025-07-28
Payer: MEDICARE

## 2025-07-28 ENCOUNTER — APPOINTMENT (OUTPATIENT)
Dept: HOME HEALTH SERVICES | Facility: HOME HEALTH | Age: 89
End: 2025-07-28

## 2025-07-28 VITALS
RESPIRATION RATE: 16 BRPM | DIASTOLIC BLOOD PRESSURE: 80 MMHG | TEMPERATURE: 97.1 F | SYSTOLIC BLOOD PRESSURE: 120 MMHG | HEART RATE: 64 BPM | OXYGEN SATURATION: 98 %

## 2025-07-28 DIAGNOSIS — Z97.3 PRESENCE OF SPECTACLES AND CONTACT LENSES: ICD-10-CM

## 2025-07-28 DIAGNOSIS — I70.0 ATHEROSCLEROSIS OF AORTA: ICD-10-CM

## 2025-07-28 DIAGNOSIS — Z97.4 PRESENCE OF EXTERNAL HEARING-AID: ICD-10-CM

## 2025-07-28 DIAGNOSIS — N39.0 URINARY TRACT INFECTION, SITE NOT SPECIFIED: ICD-10-CM

## 2025-07-28 DIAGNOSIS — Z20.822 CONTACT WITH AND (SUSPECTED) EXPOSURE TO COVID-19: ICD-10-CM

## 2025-07-28 DIAGNOSIS — R45.1 RESTLESSNESS AND AGITATION: ICD-10-CM

## 2025-07-28 DIAGNOSIS — R45.3 DEMORALIZATION AND APATHY: ICD-10-CM

## 2025-07-28 DIAGNOSIS — R53.2 FUNCTIONAL QUADRIPLEGIA: ICD-10-CM

## 2025-07-28 DIAGNOSIS — R45.89 OTHER SYMPTOMS AND SIGNS INVOLVING EMOTIONAL STATE: ICD-10-CM

## 2025-07-28 DIAGNOSIS — E78.5 HYPERLIPIDEMIA, UNSPECIFIED: ICD-10-CM

## 2025-07-28 DIAGNOSIS — Z74.01 BED CONFINEMENT STATUS: ICD-10-CM

## 2025-07-28 DIAGNOSIS — G30.9 ALZHEIMER'S DISEASE, UNSPECIFIED: ICD-10-CM

## 2025-07-28 DIAGNOSIS — R53.81 OTHER MALAISE: ICD-10-CM

## 2025-07-28 DIAGNOSIS — N18.30 CHRONIC KIDNEY DISEASE, STAGE 3 UNSPECIFIED: ICD-10-CM

## 2025-07-28 DIAGNOSIS — F32.A DEPRESSION, UNSPECIFIED: ICD-10-CM

## 2025-07-28 DIAGNOSIS — S93.402A SPRAIN OF UNSPECIFIED LIGAMENT OF LEFT ANKLE, INITIAL ENCOUNTER: ICD-10-CM

## 2025-07-28 DIAGNOSIS — I77.1 STRICTURE OF ARTERY: ICD-10-CM

## 2025-07-28 DIAGNOSIS — I10 ESSENTIAL (PRIMARY) HYPERTENSION: ICD-10-CM

## 2025-07-28 DIAGNOSIS — U07.1 COVID-19: ICD-10-CM

## 2025-07-28 DIAGNOSIS — Z86.79 PERSONAL HISTORY OF OTHER DISEASES OF THE CIRCULATORY SYSTEM: ICD-10-CM

## 2025-07-28 DIAGNOSIS — Z74.9 PROBLEM RELATED TO CARE PROVIDER DEPENDENCY, UNSPECIFIED: ICD-10-CM

## 2025-07-28 DIAGNOSIS — F02.80 ALZHEIMER'S DISEASE, UNSPECIFIED: ICD-10-CM

## 2025-07-28 PROCEDURE — 99345 HOME/RES VST NEW HIGH MDM 75: CPT | Mod: 25

## 2025-07-28 PROCEDURE — G0506: CPT

## 2025-07-28 PROCEDURE — 99497 ADVNCD CARE PLAN 30 MIN: CPT

## 2025-07-28 RX ORDER — NITROFURANTOIN MACROCRYSTALS 100 MG/1
100 CAPSULE ORAL
Qty: 30 | Refills: 3 | Status: ACTIVE | COMMUNITY
Start: 2025-07-28 | End: 1900-01-01

## 2025-07-28 RX ORDER — METOPROLOL TARTRATE 25 MG/1
25 TABLET ORAL TWICE DAILY
Qty: 45 | Refills: 3 | Status: ACTIVE | COMMUNITY
Start: 2025-07-28 | End: 1900-01-01

## 2025-07-28 RX ORDER — SENNOSIDES 8.6 MG/1
8.6 CAPSULE, GELATIN COATED ORAL
Qty: 180 | Refills: 3 | Status: ACTIVE | COMMUNITY
Start: 2025-07-28 | End: 1900-01-01

## 2025-07-30 PROBLEM — N18.30 CKD (CHRONIC KIDNEY DISEASE) STAGE 3, GFR 30-59 ML/MIN: Status: ACTIVE | Noted: 2025-07-28

## 2025-07-30 PROBLEM — I70.0 AORTIC CALCIFICATION: Status: ACTIVE | Noted: 2025-07-28

## 2025-07-30 PROBLEM — N39.0 RECURRENT UTI: Status: ACTIVE | Noted: 2025-07-28

## 2025-07-30 PROBLEM — I77.1 TORTUOUS AORTA: Status: ACTIVE | Noted: 2025-07-28

## 2025-07-30 PROBLEM — R53.2 FUNCTIONAL QUADRIPLEGIA: Status: ACTIVE | Noted: 2025-07-28

## 2025-08-20 ENCOUNTER — APPOINTMENT (OUTPATIENT)
Dept: HOME HEALTH SERVICES | Facility: HOME HEALTH | Age: 89
End: 2025-08-20

## 2025-08-20 VITALS
HEART RATE: 66 BPM | SYSTOLIC BLOOD PRESSURE: 158 MMHG | RESPIRATION RATE: 16 BRPM | DIASTOLIC BLOOD PRESSURE: 72 MMHG | OXYGEN SATURATION: 99 % | TEMPERATURE: 97.5 F

## 2025-08-21 DIAGNOSIS — K05.10 CHRONIC GINGIVITIS, PLAQUE INDUCED: ICD-10-CM

## 2025-08-21 RX ORDER — AMOXICILLIN AND CLAVULANATE POTASSIUM 875; 125 MG/1; MG/1
875-125 TABLET, COATED ORAL
Qty: 10 | Refills: 0 | Status: ACTIVE | COMMUNITY
Start: 2025-08-21 | End: 1900-01-01

## 2025-08-21 RX ORDER — CHLORHEXIDINE GLUCONATE, 0.12% ORAL RINSE 1.2 MG/ML
0.12 SOLUTION DENTAL
Qty: 1 | Refills: 11 | Status: ACTIVE | COMMUNITY
Start: 2025-08-21 | End: 1900-01-01